# Patient Record
Sex: FEMALE | Race: WHITE | Employment: OTHER | URBAN - METROPOLITAN AREA
[De-identification: names, ages, dates, MRNs, and addresses within clinical notes are randomized per-mention and may not be internally consistent; named-entity substitution may affect disease eponyms.]

---

## 2017-01-02 ENCOUNTER — HOSPITAL ENCOUNTER (EMERGENCY)
Facility: CLINIC | Age: 67
Discharge: HOME | End: 2017-01-02
Attending: PHYSICIAN ASSISTANT | Admitting: PHYSICIAN ASSISTANT
Payer: COMMERCIAL

## 2017-01-02 ENCOUNTER — APPOINTMENT (OUTPATIENT)
Dept: GENERAL RADIOLOGY | Facility: CLINIC | Age: 67
End: 2017-01-02
Attending: EMERGENCY MEDICINE
Payer: COMMERCIAL

## 2017-01-02 VITALS
SYSTOLIC BLOOD PRESSURE: 147 MMHG | HEIGHT: 64 IN | OXYGEN SATURATION: 99 % | RESPIRATION RATE: 16 BRPM | TEMPERATURE: 98.3 F | DIASTOLIC BLOOD PRESSURE: 71 MMHG | HEART RATE: 82 BPM

## 2017-01-02 DIAGNOSIS — S80.01XA CONTUSION OF RIGHT KNEE, INITIAL ENCOUNTER: ICD-10-CM

## 2017-01-02 DIAGNOSIS — M25.522 LEFT ELBOW PAIN: ICD-10-CM

## 2017-01-02 PROCEDURE — 73090 X-RAY EXAM OF FOREARM: CPT | Mod: LT

## 2017-01-02 PROCEDURE — 99283 EMERGENCY DEPT VISIT LOW MDM: CPT

## 2017-01-02 PROCEDURE — 25000132 ZZH RX MED GY IP 250 OP 250 PS 637

## 2017-01-02 RX ORDER — IBUPROFEN 200 MG
TABLET ORAL
Status: COMPLETED
Start: 2017-01-02 | End: 2017-01-02

## 2017-01-02 RX ORDER — IBUPROFEN 600 MG/1
600 TABLET, FILM COATED ORAL ONCE
Status: COMPLETED | OUTPATIENT
Start: 2017-01-02 | End: 2017-01-02

## 2017-01-02 RX ADMIN — IBUPROFEN 600 MG: 600 TABLET, FILM COATED ORAL at 16:29

## 2017-01-02 RX ADMIN — IBUPROFEN 600 MG: 200 TABLET, FILM COATED ORAL at 16:29

## 2017-01-02 ASSESSMENT — ENCOUNTER SYMPTOMS: WOUND: 1

## 2017-01-02 NOTE — ED AVS SNAPSHOT
Emergency Department    [address] [place] [address]    Phone:  [phone]    Fax:  [phone]                                       Elise Bernard   MRN: [id number]    Department:   Emergency Department   Date of Visit:  1/2/2017           After Visit Summary Signature Page     I have received my discharge instructions, and my questions have been answered. I have discussed any challenges I see with this plan with the nurse or doctor.    ..........................................................................................................................................  Patient/Patient Representative Signature      ..........................................................................................................................................  Patient Representative Print Name and Relationship to Patient    ..................................................               ................................................  Date                                            Time    ..........................................................................................................................................  Reviewed by Signature/Title    ...................................................              ..............................................  Date                                                            Time

## 2017-01-02 NOTE — ED AVS SNAPSHOT
Emergency Department    [address] [facility] [address]    Phone:  [phone]    Fax:  [phone]                                       Elise Bernard   MRN: [id number]    Department:   Emergency Department   Date of Visit:  1/2/2017           Patient Information     Date Of Birth          1950        Your diagnoses for this visit were:     Left elbow pain     Contusion of right knee, initial encounter        You were seen by Christal Harrison PA-C.      Follow-up Information     Follow up with Myles Birmingham MD.    Specialty:  Family Practice    Contact information:    [facility]  [address] [facility] [address]  [facility] [address]  [phone]          Follow up with  Emergency Department.    Specialty:  EMERGENCY MEDICINE    Why:  If symptoms worsen    Contact information:    [address] [facility] [id number] [phone]        Discharge Instructions       Use Acetaminophen and/or Ibuprofen as needed for pain.   Wear sling for comfort. Follow-up with primary care as already scheduled for Friday.     Return to the ED with worsening pain, numbness/ tingling, weakness or if you become worse in any way.         Discharge Instructions  Extremity Injury    You were seen today for an injury to an extremity (arm, hand, leg, or foot). You may have a bruise, strain, or fracture (broken bone).    Return to the Emergency Department or see your regular doctor if your injured area is not back to normal within 5-7 days.    Return to the Emergency Department right away if:    Your pain seems to change or get worse or there is pain in a new area.    Your extremity becomes pale, cool, blue, or numb or tingling past the injury.    You have more drainage, redness or pain in the area of the cut or abrasion.    You have pain that you can t control with the medicine recommended or prescribed here, or you have pain that seems too much for your injury.    Your child will not  stop crying or is much more fussy than normal.    You have new symptoms or anything that worries you.    What to Expect:    Your swelling and pain may be worse the day after your injury, but should not be severe and should start getting better after that. You should not have new symptoms and your pain should not get worse.    You may start to get a bruise over the injured area or below the injured area.    Your movement and strength should get better with time.    Some injuries may not show up until after you have left the Emergency Department so it is important to follow-up with your regular doctor.    Your injury may prevent you from working.  Follow-up with your regular doctor to get a work release note.    Pain medications or your injury may make it unsafe to drive or operate machinery.    Home Care:    Apply ice your injured area for 15 minutes at a time, at least 3 times a day. Use a cloth between the ice bag and your skin to prevent frostbite.     Do not sleep with an ice pack or heating pad on, since this can cause burns or skin injury.    Rest your injured area for at least 1-2 days. After that you may start using your extremity again as long as there is not too much pain.     Raise the injured area above the level of your heart as much as possible in the first 1-2 days.    Use Tylenol  (acetaminophen), Motrin (ibuprofen), or Advil  (ibuprofen) for your pain unless you have an allergy or are told not to use these medications by your doctor.  Take the medications as instructed on the package. Tylenol  (acetaminophen) is in many prescription medicines and non-prescription medicines--check all of your medicines to be sure you aren t taking more than 3000 mg per day.    You may use an elastic bandage (Ace  Wrap) if it makes you more comfortable. Wrap it just tight enough to provide light compression, like a new pair of socks feels. Loosen the bandage if you have swelling past the bandage.      Please follow any  other instructions that were discussed with you by your doctor.    MORE INFORMATION:    X-rays:  X-rays done today were read by your doctor but will also be read by a radiologist.  We will contact you if the radiologist sees anything different on the x-ray.  Your regular doctor may also want to review your x-rays on follow-up.    You could have a fracture (break), even if we told you your x-rays were normal. X-rays are not always certain, and some fractures are hard to see and may not show up right away.  Also, your x-ray may look like you have a fracture, even though you do not.  It is important to follow-up with your regular doctor.     Stretching:  If your injury was to your arm or shoulder and your doctor put you in a sling or an immobilizer, it is important that you take off your immobilizer within 3 days and stretch/move your shoulder, unless your doctor specifically tells you to not move your shoulder.  This is to prevent further injury such as a  frozen shoulder .     If you were given a prescription for medicine here today, be sure to read all of the information (including the package insert) that comes with your prescription.  This will include important information about the medicine, its side effects, and any warnings that you need to know about.  The pharmacist who fills the prescription can provide more information and answer questions you may have about the medicine.  If you have questions or concerns that the pharmacist cannot address, please call or return to the Emergency Department.       Remember that you can always come back to the Emergency Department if you are not able to see your regular doctor in the amount of time listed above, if you get any new symptoms, or if there is anything that worries you.      Future Appointments        Provider Department Dept Phone Center    1/17/2017 1:30 PM Giuseppe Mullins MD [facility] [phone] [facility]      24 Hour Appointment Hotline       To make  an appointment at any [facility] clinic, call [phone] ([phone]). If you don't have a family doctor or clinic, we will help you find one. [facility] clinics are conveniently located to serve the needs of you and your family.             Review of your medicines      Our records show that you are taking the medicines listed below. If these are incorrect, please call your family doctor or clinic.        Dose / Directions Last dose taken    ASPIRIN PO   Dose:  81 mg        Take 81 mg by mouth daily   Refills:  0        donepezil 5 MG tablet   Commonly known as:  ARICEPT   Dose:  5 mg   Quantity:  30 tablet        Take 1 tablet (5 mg) by mouth At Bedtime   Refills:  3        escitalopram 10 MG tablet   Commonly known as:  LEXAPRO        Refills:  0        estradiol 0.05 MG/24HR BIW patch   Commonly known as:  VIVELLE-DOT   Dose:  1 patch        Place 1 patch onto the skin twice a week   Refills:  0        GEMFIBROZIL PO   Dose:  600 mg        Take 600 mg by mouth daily   Refills:  0        LIVALO PO   Dose:  4 mg        Take 4 mg by mouth daily   Refills:  0        METOPROLOL SUCCINATE ER PO   Dose:  50 mg        Take 50 mg by mouth daily   Refills:  0        OVER-THE-COUNTER        allertec ( antihystamine) 10 mg daily   Refills:  0        PROGESTERONE MICRONIZED PO   Dose:  200 mg        Take 200 mg by mouth 10 days a month   Refills:  0        VITAMIN D (CHOLECALCIFEROL) PO   Dose:  34849 Units        Take 50,000 Units by mouth once a week   Refills:  0                Procedures and tests performed during your visit     Radius/Ulna XR,  PA &LAT, left      Orders Needing Specimen Collection     None      Pending Results     No orders found from 1/1/2017 to 1/3/2017.       Test Results from your hospital stay           1/2/2017  3:48 PM - Interface, Radiant Ib      Narrative     XR FOREARM LT 2 VW 1/2/2017 3:42 PM    HISTORY: Fall.    COMPARISON: None.    FINDINGS: No acute fracture or malalignment. Osseous  structures are  within normal limits.        Impression     IMPRESSION: No acute osseous abnormality.    JACKLYN LOPEZ MD                Clinical Quality Measure: Blood Pressure Screening     Your blood pressure was checked while you were in the emergency department today. The last reading we obtained was  BP: 147/71 mmHg . Please read the guidelines below about what these numbers mean and what you should do about them.  If your systolic blood pressure (the top number) is less than 120 and your diastolic blood pressure (the bottom number) is less than 80, then your blood pressure is normal. There is nothing more that you need to do about it.  If your systolic blood pressure (the top number) is 120-139 or your diastolic blood pressure (the bottom number) is 80-89, your blood pressure may be higher than it should be. You should have your blood pressure rechecked within a year by a primary care provider.  If your systolic blood pressure (the top number) is 140 or greater or your diastolic blood pressure (the bottom number) is 90 or greater, you may have high blood pressure. High blood pressure is treatable, but if left untreated over time it can put you at risk for heart attack, stroke, or kidney failure. You should have your blood pressure rechecked by a primary care provider within the next 4 weeks.  If your provider in the emergency department today gave you specific instructions to follow-up with your doctor or provider even sooner than that, you should follow that instruction and not wait for up to 4 weeks for your follow-up visit.        Thank you for choosing [facility]       Thank you for choosing [facility] for your care. Our goal is always to provide you with excellent care. Hearing back from our patients is one way we can continue to improve our services. Please take a few minutes to complete the written survey that you may receive in the mail after you visit with us. Thank you!        MyChart Information      [employer] gives you secure access to your electronic health record. If you see a primary care provider, you can also send messages to your care team and make appointments. If you have questions, please call your primary care clinic.  If you do not have a primary care provider, please call [phone] and they will assist you.        After Visit Summary       This is your record. Keep this with you and show to your community pharmacist(s) and doctor(s) at your next visit.

## 2017-01-02 NOTE — ED PROVIDER NOTES
"  History     Chief Complaint:  Fall    HPI   Elise Bernard is a 66 year old female who presents with 6/10 left elbow pain and right knee pain after a mechanical fall at 1200 today. She slipped and fall, landing on her right knee and left elbow. She did not hit her head, lose consciousness, or sustain any other injuries. She currently rates her pain 6/10 at the elbow that worsens with movement. She also has bruising and a superficial abrasion to the knee. She does have mild pain to the knee, but that has been improving since the fall and she has been able to walk without difficulty. The patient denies any numbness, tingling, weakness, neck or back pain, or any other complications or concerns.      Allergies:  Vicodin     Medications:    Aricept  Lexapro  Aspirin  Vitamin D  Metoprolol  Livalo  Gemfibrozil  Estradiol patch  Progesterone  Allertec     Past Medical History:    HTN  HLD  DM  Dementia    Past Surgical History:    Adenoidectomy  Tonsillectomy  Breast reduction  Left tympanoplasty    Family History:    History reviewed.  No significant family history.      Social History:  Relationship status:   Tobacco use: Negative  Alcohol use: Positive (occasionally)  The patient presents with her .      Review of Systems   HENT:        Negative for head injury.    Musculoskeletal: Negative for back pain, gait problem and neck pain.        Positive for elbow and knee pain.    Skin: Positive for wound (abrasion and bruising).   Neurological: Negative for syncope, weakness and numbness.   All other systems reviewed and are negative.    Physical Exam   First Vitals:  BP: 147/71 mmHg  Pulse: 84  Temp: 98.3  F (36.8  C)  Resp: 16  Height: 162.6 cm (5' 4\")  SpO2: 99 %      Physical Exam  General: Resting comfortably on the gurney.    Head:  The scalp, head and face appear normal. No evidence of trauma.   Neck:  Supple, no rigidity noted. Normal ROM.     No cervical midline or paraspinal muscle tenderness. " No step-offs.   Resp:  Non-labored breathing. No tachypnea.   CV:  Radial, DP and PT pulses intact and symmetric.     Capillary refill less than 2 seconds bilateral hands and feet.     MS:  Normal muscular tone.     5/5 and symmetric strength with dorsi- and plantar-flexion at the ankle, knee flexion and extension, .     Normal and symmetric ROM with dorsi- and plantar-flexion at the ankle, knee flexion and extension, elbow flexion and extension (though with some pain on the left at extremes of ROM), and shoulder rotation.     Extensor aspect left elbow tenderness with palpation with mild swelling, no deformity or ecchymosis. Left upper extremity otherwise non-tender with palpation.     Right lower extremity:  Anterior knee there is ecchymosis and a superficial abrasion with no active bleeding. Tenderness with palpation over the ecchymosis, no other knee or lower extremity tenderness. No effusion or deformity. No laxity with varus or valgus stress, negative anterior and posterior drawer.    No thoracic or lumbar midline or paraspinal muscle tenderness with palpation. No step-offs.   Neuro: GCS 15.    Awake and alert. Obeys commands appropriately.     Sensation intact to light touch upper and lower extremities.   Skin:  As in MS, otherwise no rash, ecchymosis, abrasions or lacerations.   Psych:  Normal affect. Appropriate interactions.      Emergency Department Course   Imaging:  Radiographic findings were communicated with the patient who voiced understanding of the findings.    Left Radius/Ulna XR, PA & LAT per radiology:   No acute osseous abnormality.     Interventions:  1629: Ibuprofen, 600 mg, PO    Emergency Department Course:  Nursing notes and vitals reviewed.  I performed an exam of the patient as documented above.  The above workup was undertaken.  1654: I discussed XR results.  The patient was placed in a sling for comfort.   Findings and plan explained to the Patient and spouse. Patient discharged  home with instructions regarding supportive care, medications, and reasons to return. The importance of close follow-up was reviewed.    Impression & Plan    Medical Decision Making:  Elise Bernard is a 66 year old female presents for evaluation after a mechanical fall.  Signs and symptoms are consistent with contusions of the knee and more mild contusion of the elbow.  A broad differential was considered including sprain, strain, fracture, tendon rupture, nerve impingement/compromise, referred pain. X-ray of the elbow was negative for fracture or dislocation. The patient has been able to walk on the knee and has no difficulty with full active range of motion, therefore knee x-ray was not indicated. Supportive outpatient management is indicated.  Rest, ice, and elevation treatment was discussed with the patient. No other areas of pain or injury to require further emergent evaluation. Close follow-up with patient's primary care physician per discharge precautions. I discussed the results, plan and any additional questions with the patient and her . They verbalized understanding and agreement with the plan.       Diagnosis:    ICD-10-CM   1. Left elbow pain M25.522   2. Contusion of right knee, initial encounter S80.01XA     Disposition:  Discharge to home with primary care follow up.       ISkylar, am serving as a scribe on 1/2/2017 at 4:54 PM to personally document services performed by Christal Harrison PA-C, based on my observations and the provider's statements to me.     EMERGENCY DEPARTMENT    Christal Harrison PA-C  01/04/17 2004

## 2017-01-02 NOTE — DISCHARGE INSTRUCTIONS
Use Acetaminophen and/or Ibuprofen as needed for pain.   Wear sling for comfort. Follow-up with primary care as already scheduled for Friday.     Return to the ED with worsening pain, numbness/ tingling, weakness or if you become worse in any way.         Discharge Instructions  Extremity Injury    You were seen today for an injury to an extremity (arm, hand, leg, or foot). You may have a bruise, strain, or fracture (broken bone).    Return to the Emergency Department or see your regular doctor if your injured area is not back to normal within 5-7 days.    Return to the Emergency Department right away if:    Your pain seems to change or get worse or there is pain in a new area.    Your extremity becomes pale, cool, blue, or numb or tingling past the injury.    You have more drainage, redness or pain in the area of the cut or abrasion.    You have pain that you can t control with the medicine recommended or prescribed here, or you have pain that seems too much for your injury.    Your child will not stop crying or is much more fussy than normal.    You have new symptoms or anything that worries you.    What to Expect:    Your swelling and pain may be worse the day after your injury, but should not be severe and should start getting better after that. You should not have new symptoms and your pain should not get worse.    You may start to get a bruise over the injured area or below the injured area.    Your movement and strength should get better with time.    Some injuries may not show up until after you have left the Emergency Department so it is important to follow-up with your regular doctor.    Your injury may prevent you from working.  Follow-up with your regular doctor to get a work release note.    Pain medications or your injury may make it unsafe to drive or operate machinery.    Home Care:    Apply ice your injured area for 15 minutes at a time, at least 3 times a day. Use a cloth between the ice bag and  your skin to prevent frostbite.     Do not sleep with an ice pack or heating pad on, since this can cause burns or skin injury.    Rest your injured area for at least 1-2 days. After that you may start using your extremity again as long as there is not too much pain.     Raise the injured area above the level of your heart as much as possible in the first 1-2 days.    Use Tylenol  (acetaminophen), Motrin (ibuprofen), or Advil  (ibuprofen) for your pain unless you have an allergy or are told not to use these medications by your doctor.  Take the medications as instructed on the package. Tylenol  (acetaminophen) is in many prescription medicines and non-prescription medicines--check all of your medicines to be sure you aren t taking more than 3000 mg per day.    You may use an elastic bandage (Ace  Wrap) if it makes you more comfortable. Wrap it just tight enough to provide light compression, like a new pair of socks feels. Loosen the bandage if you have swelling past the bandage.      Please follow any other instructions that were discussed with you by your doctor.    MORE INFORMATION:    X-rays:  X-rays done today were read by your doctor but will also be read by a radiologist.  We will contact you if the radiologist sees anything different on the x-ray.  Your regular doctor may also want to review your x-rays on follow-up.    You could have a fracture (break), even if we told you your x-rays were normal. X-rays are not always certain, and some fractures are hard to see and may not show up right away.  Also, your x-ray may look like you have a fracture, even though you do not.  It is important to follow-up with your regular doctor.     Stretching:  If your injury was to your arm or shoulder and your doctor put you in a sling or an immobilizer, it is important that you take off your immobilizer within 3 days and stretch/move your shoulder, unless your doctor specifically tells you to not move your shoulder.  This is  to prevent further injury such as a  frozen shoulder .     If you were given a prescription for medicine here today, be sure to read all of the information (including the package insert) that comes with your prescription.  This will include important information about the medicine, its side effects, and any warnings that you need to know about.  The pharmacist who fills the prescription can provide more information and answer questions you may have about the medicine.  If you have questions or concerns that the pharmacist cannot address, please call or return to the Emergency Department.       Remember that you can always come back to the Emergency Department if you are not able to see your regular doctor in the amount of time listed above, if you get any new symptoms, or if there is anything that worries you.

## 2017-01-04 ENCOUNTER — CARE COORDINATION (OUTPATIENT)
Dept: CASE MANAGEMENT | Facility: CLINIC | Age: 67
End: 2017-01-04

## 2017-01-04 ASSESSMENT — ENCOUNTER SYMPTOMS
NUMBNESS: 0
BACK PAIN: 0
WEAKNESS: 0
NECK PAIN: 0

## 2017-01-09 NOTE — PROGRESS NOTES
Clinic Care Coordination Contact  OUTREACH    Referral Information:  Referral Source: ED Follow-Up  Reason for Contact: Fall with injury  Care Conference: No     Universal Utilization:   ED Visits in last year: 1  Hospital visits in last year: 0      Clinical Concerns:  Current Medical Concerns:  would not discuss    Current Behavioral Concerns: Dementia. Goes to Adult        Medication Management:  Current Outpatient Prescriptions   Medication     donepezil (ARICEPT) 5 MG tablet     escitalopram (LEXAPRO) 10 MG tablet     ASPIRIN PO     VITAMIN D, CHOLECALCIFEROL, PO     METOPROLOL SUCCINATE ER PO     Pitavastatin Calcium (LIVALO PO)     GEMFIBROZIL PO     OVER-THE-COUNTER     estradiol (VIVELLE-DOT) 0.05 MG/24HR patch     PROGESTERONE MICRONIZED PO     No current facility-administered medications for this visit.     Psychosocial:  Current living arrangement:: I live in a private home with spouse  Financial/Insurance: [facility]     Resources and Interventions:  Current Resources:Refused to discuss the need for any at this time  Advanced Care Plans/Directives on file:: Yes    Patient/Caregiver understanding:Call placed to the home number given, as a follow up from an ED visit on 1/2/2017, injuring an elbow and knee.  answered the phone and was not pleased with receiving the call from this writer. Stated he was the POA and could answer questions for his wife. Explained who I was and why I was calling. He questioned why I hadn't read her chart and knew that she would be coming in to see Dr Birmingham tomorrow, 1/10/2017. Stated there is nothing wrong and he sis not appreciate cold calls. Explained to him where I gather my information and from what report. It did not seem to matter at this point. He did state that his wife is ok and that she is at Adult .     Upcoming appointment: 01/10/17     Plan: Will notify Dr Birmingham of call placed, prior to appt with member and her  on  1/10/2017.    Rashida Carmichael RN  Care Coordinator/  Phone: [phone]  Email: [email or web address]

## 2017-01-17 ENCOUNTER — OFFICE VISIT (OUTPATIENT)
Dept: NEUROLOGY | Facility: CLINIC | Age: 67
End: 2017-01-17

## 2017-01-17 VITALS
WEIGHT: 134.6 LBS | BODY MASS INDEX: 22.98 KG/M2 | DIASTOLIC BLOOD PRESSURE: 64 MMHG | HEIGHT: 64 IN | HEART RATE: 87 BPM | SYSTOLIC BLOOD PRESSURE: 151 MMHG

## 2017-01-17 DIAGNOSIS — G31.09 FRONTOTEMPORAL DEMENTIA (H): Primary | ICD-10-CM

## 2017-01-17 DIAGNOSIS — F02.80 FRONTOTEMPORAL DEMENTIA (H): Primary | ICD-10-CM

## 2017-01-17 NOTE — LETTER
1/17/2017      RE: Elise Bernard  [address] [place] [address]       No notes on file    Giuseppe Mullins MD

## 2017-01-17 NOTE — LETTER
1/17/2017     RE: Elise Bernard  [address] [place] [address]     Dear Colleague,    Thank you for referring your patient, Elise Bernard, to the [facility] at [facility]. Please see a copy of my visit note below.    Mrs. Bernard is a 66 year old woman who was referred by Dr Patel, thank you for allowing me to participate in the care of your patient.      Sincerely,    Giuseppe Mullins MD

## 2017-01-17 NOTE — Clinical Note
1/17/2017       RE: Elise Bernard  [address] [place] [address]     Dear Colleague,    Thank you for referring your patient, Elise Bernard, to the [facility] at [facility]. Please see a copy of my visit note below.    No notes on file    Again, thank you for allowing me to participate in the care of your patient.      Sincerely,    Giuseppe Mullins MD

## 2017-01-24 ENCOUNTER — TELEPHONE (OUTPATIENT)
Dept: NEUROLOGY | Facility: CLINIC | Age: 67
End: 2017-01-24

## 2017-01-24 DIAGNOSIS — R41.3 MEMORY LOSS: Primary | ICD-10-CM

## 2017-01-26 ENCOUNTER — TELEPHONE (OUTPATIENT)
Dept: NEUROLOGY | Facility: CLINIC | Age: 67
End: 2017-01-26

## 2017-01-26 DIAGNOSIS — R41.3 MEMORY LOSS: Primary | ICD-10-CM

## 2017-01-27 ENCOUNTER — OFFICE VISIT (OUTPATIENT)
Dept: NEUROLOGY | Facility: CLINIC | Age: 67
End: 2017-01-27

## 2017-01-27 VITALS
DIASTOLIC BLOOD PRESSURE: 83 MMHG | HEIGHT: 64 IN | SYSTOLIC BLOOD PRESSURE: 144 MMHG | HEART RATE: 89 BPM | WEIGHT: 134 LBS | BODY MASS INDEX: 22.88 KG/M2

## 2017-01-27 DIAGNOSIS — R41.3 MEMORY LOSS: ICD-10-CM

## 2017-01-27 LAB
APPEARANCE CSF: CLEAR
COLOR CSF: COLORLESS
GLUCOSE CSF-MCNC: 67 MG/DL (ref 40–70)
MISCELLANEOUS TEST: NORMAL
PROT CSF-MCNC: 53 MG/DL (ref 15–60)
RBC # CSF MANUAL: 0 /UL (ref 0–2)
TUBE # CSF: 3 #
WBC # CSF MANUAL: 0 /UL (ref 0–5)

## 2017-01-27 RX ORDER — BLOOD SUGAR DIAGNOSTIC
STRIP MISCELLANEOUS
COMMUNITY
Start: 2017-01-23

## 2017-01-27 RX ORDER — ESTRADIOL 0.04 MG/D
1 PATCH, EXTENDED RELEASE TRANSDERMAL
COMMUNITY
Start: 2017-01-20

## 2017-01-27 NOTE — PROGRESS NOTES
PURPOSE OF VISIT: Diagnostic lumbar puncture.   REQUESTING PHYSICIAN: Dr Mullins  INDICATION: Clinical evaluation of dementia and behavior changes.   CONTRAINDICATIONS: This individual has no sensitivity to topical iodine or allergy to local anesthetic. She is not on any anticoagulation medications. She has no history of bleeding diathesis or coagulopathy.   CONSENT: This was obtained directly from the patient and  and signed by patient's  who is patient's POA and  as documented on the [facility] Affirmation of Consent Surgery or Invasive Procedure.   PATIENT SAFETY: Invasive procedure safety check was successfully completed.   : Justine ELLIS CNP  ASSISTANT: Jeronimo De Leon MD  DESCRIPTION: Elise Bernard was positioned in the left lateral decubitus. The lumbar skin was prepared with an iodine-containing solution at the mid lumbar level. Five  cubic centimeters of 1%  lidocaine  was infiltrated locally at the L4-L5 interspace level initially and repeated five cubic centimeters of 1% lidocaine once due to patient's discomfort . The lumbar subarachnoid space was not entered on the third pass by utilizing a 3-1/2 inch, 22 gauge, pencil-point Jamaal needle. The above mentioned space was entered on the second pass with 3-1/2 inch, 20 gauge Quincke needle. There was an initial show of pink tinge, which cleared rapidly. The opening pressure was eleven cm CSF. The CSF meniscus exhibited good respiratory and cardiac dynamics. Ten milliliters of spinal fluid was removed and sent to the lab for tests per Dr. Mullins request, see Epic for orders.  COMPLICATIONS: None were identified.   HEALTH LITERACY: The concept and management of post lumbar puncture headache were discussed with the patient and her  prior to the start of the lumbar puncture.   DISPOSITION: Patient left the Clinic in no acute distress. Patient was accompanied by her home nurse/assitent Zhen and clinic RN to the  laboratory. Study results will be conveyed to the patient by Dr. Mullins    This procedure was performed under a hospital privileging agreement with Dr. Decker, Neurologist     Justine ELLIS, [facility]  Neurology Clinic

## 2017-01-27 NOTE — PROGRESS NOTES
[facility] NEUROLOGY  [address] [place] [address]  Dept: [phone]  ______________________________________________________________________________    Patient: Elise Bernard   : 1950   MRN: [id number]   2017    INVASIVE PROCEDURE SAFETY CHECKLIST    Date: 2017   Procedure:Diagnostic Lumbar Puncture  Patient Name: Elise Bernard  MRN: [id number]  YOB: 1950    Action: Complete sections as appropriate. Any discrepancy results in a HARD COPY until resolved.     PRE PROCEDURE:  Patient ID verified with 2 identifiers (name and  or MRN): Yes  Procedure and site verified with patient/designee (when able): Yes  Accurate consent documentation in medical record: Yes  H&P (or appropriate assessment) documented in medical record: Yes  H&P must be up to 20 days prior to procedure and updates within 24 hours of procedure as applicable: Yes  Relevant diagnostic and radiology test results appropriately labeled and displayed as applicable: Yes  Procedure site(s) marked with provider initials: NA    TIMEOUT:  Time-Out performed immediately prior to starting procedure, including verbal and active participation of all team members addressing the following:Yes  * Correct patient identify  * Confirmed that the correct side and site are marked  * An accurate procedure consent form  * Agreement on the procedure to be done  * Correct patient position  * Relevant images and results are properly labeled and appropriately displayed  * The need to administer antibiotics or fluids for irrigation purposes during the procedure as applicable   * Safety precautions based on patient history or medication use    DURING PROCEDURE: Verification of correct person, site, and procedures any time the responsibility for care of the patient is transferred to another member of the care team.

## 2017-01-27 NOTE — NURSING NOTE
Medical     Are you taking Coumadin (Warfarin)?No   Are you currently receiving Heparin? No  Do you take aspirin daily?            No    Infectious Disease   Have you been exposed to AIDS or HIV? No  Are you HIV positive?        No  Do you have chronic hepatitis?               No  Is your immune system otherwise compromised? No    Allergies   Do you have a sensitivity to latex? No  If yes, please explain:    Are you allergic to iodine?         No  Are you allergic to local anesthetic? No

## 2017-01-27 NOTE — PATIENT INSTRUCTIONS
Having a Lumbar Puncture  A lumbar puncture (spinal tap) may be used to help diagnose certain problems in your brain or spinal cord. Prepare for your test as instructed. From start to finish, your procedure will take about 30-60 minutes. The test may be done in a medical office.This test may be done in an acute care setting like the emergency room or the hospital. Occasionally, the procedure is done in a radiology suite with X-ray guidance. Your doctor may screen you for bleeding disorders and may order a head CT or MRI before the procedure to ensure there is no increased risk of performing the procedure.    During the test  You will lie on your side with your knees drawn into your chest. (This is called the fetal position.) Or, you may be asked to sit bent forward, with your chin down. First, your low back will be wiped with a special cleanser. It will then be injected with a numbing medication. Then, the doctor will insert a sterile needle into the sac that contains the spinal fluid. Despite the use of the local anesthetic, you may feel some pain or pressure when this happens. Try to remain still. And be sure to do as you re asked. Some spinal fluid will be withdrawn though the needle. The needle is then removed. Finally, a small bandage is placed over the puncture site. You may be asked to lie still for a short time before you leave.  After the test    When you re able to leave, have an adult family member or friend drive you home.     When you get home, rest as directed by your healthcare provider.     If you get a headache, lying flat and drinking plenty of fluids may help relieve it. The headache is typically positional and gets better over time. You may also want to take an over-the-counter pain reliever, but avoid aspirin. Drinking caffeinated beverages, such as soda, coffee, or tea, can help relieve a headache after a lumbar puncture.    The day after your lumbar puncture, you can remove your  bandage.     Your provider will tell you when the results of your lumbar puncture are ready.  Call your doctor if you have:    A severe headache or a headache that lasts 2 or more days    Pain in your back that persists    Tingling in your groin or legs    Fever    Change in bowel or urination functions     4918-1422 [employer]. [address] [place] [address], [place] [address]. All rights reserved. This information is not intended as a substitute for professional medical care. Always follow your healthcare professional's instructions.

## 2017-01-29 LAB
ALB CSF/SERPL: 8.7 {RATIO}
ALBUMIN CSF-MCNC: 37 MG/DL
ALBUMIN SERPL-MCNC: 4240 G/DL
B BURGDOR IGG CSF QL IB: NORMAL
B BURGDOR IGM CSF QL IB: NORMAL
IGG CSF-MCNC: 2.7 MG/DL
IGG SERPL-MCNC: 794 MG/DL
IGG SYNTH RATE SER+CSF CALC-MRATE: ABNORMAL MG/DL
IGG/ALB CLEAR SER+CSF-RTO: 0.39
IGG/ALB CSF: 0.07 {RATIO}
MISCELLANEOUS TEST: NORMAL
OLIGOCLONAL BANDS CSF ELPH-IMP: ABNORMAL
OLIGOCLONAL BANDS CSF ELPH-IMP: ABNORMAL
OLIGOCLONAL BANDS CSF IEF: 0

## 2017-01-31 LAB — VDRL CSF QL: NORMAL

## 2017-02-01 LAB
RESULT: NORMAL
SEND OUTS MISC TEST CODE: NORMAL
SEND OUTS MISC TEST SPECIMEN: NORMAL
TEST NAME: NORMAL

## 2017-02-03 LAB — 1433 PROTEIN CSF: 2.3

## 2017-02-12 LAB — LAB SCANNED RESULT: NORMAL

## 2017-02-20 ENCOUNTER — HOSPITAL ENCOUNTER (EMERGENCY)
Facility: CLINIC | Age: 67
Discharge: HOME | End: 2017-02-20
Attending: PHYSICIAN ASSISTANT | Admitting: PHYSICIAN ASSISTANT
Payer: COMMERCIAL

## 2017-02-20 VITALS
BODY MASS INDEX: 20.66 KG/M2 | SYSTOLIC BLOOD PRESSURE: 166 MMHG | RESPIRATION RATE: 16 BRPM | TEMPERATURE: 98.4 F | HEIGHT: 64 IN | DIASTOLIC BLOOD PRESSURE: 74 MMHG | OXYGEN SATURATION: 99 % | HEART RATE: 104 BPM | WEIGHT: 121 LBS

## 2017-02-20 DIAGNOSIS — Z77.098 CHEMICAL EXPOSURE OF EYE: ICD-10-CM

## 2017-02-20 PROCEDURE — 25000132 ZZH RX MED GY IP 250 OP 250 PS 637

## 2017-02-20 PROCEDURE — 99282 EMERGENCY DEPT VISIT SF MDM: CPT

## 2017-02-20 RX ORDER — PROPARACAINE HYDROCHLORIDE 5 MG/ML
SOLUTION/ DROPS OPHTHALMIC
Status: COMPLETED
Start: 2017-02-20 | End: 2017-02-20

## 2017-02-20 RX ADMIN — PROPARACAINE HYDROCHLORIDE 1 DROP: 5 SOLUTION/ DROPS OPHTHALMIC at 17:23

## 2017-02-20 ASSESSMENT — ENCOUNTER SYMPTOMS
EYE PAIN: 1
EYE REDNESS: 1

## 2017-02-20 ASSESSMENT — VISUAL ACUITY
OD: 20/30
OS: 20/40

## 2017-02-20 NOTE — ED AVS SNAPSHOT
Emergency Department    [address] [place] [address]    Phone:  [phone]    Fax:  [phone]                                       Elise Bernard   MRN: [id number]    Department:   Emergency Department   Date of Visit:  2/20/2017           After Visit Summary Signature Page     I have received my discharge instructions, and my questions have been answered. I have discussed any challenges I see with this plan with the nurse or doctor.    ..........................................................................................................................................  Patient/Patient Representative Signature      ..........................................................................................................................................  Patient Representative Print Name and Relationship to Patient    ..................................................               ................................................  Date                                            Time    ..........................................................................................................................................  Reviewed by Signature/Title    ...................................................              ..............................................  Date                                                            Time

## 2017-02-20 NOTE — ED PROVIDER NOTES
"  History     Chief Complaint:  Eye Pain    HPI   Elise Bernard is a 66 year old female with a history of dementia who presents with her  to the emergency department today for evaluation of eye pain. The patient's  states that earlier today he witnessed the patient putting Clear Care contact solution into her eyes bilaterally.The patient states that she put 1-2 drops of Clear Care in her eyes about 2 hours ago. She indicates that she is experiencing eye pain and redness. She denies any vision changes, eye itching or drainage, headache or fever, or any other complications or concerns.     Allergies:  Vicodin, itching     Medications:    Estradiol  Accu-Chek Sunni Plus  Aricept  Lexapro  Metoprolol Succinate   Livalo  Gemfibrozil  Progesterone Micronized    Past Medical History:    Dementia  Diabetes  Hyperlipidemia  Hypertension    Past Surgical History:    Adenoidectomy  ENT Surgery  Breast Reduction  Colonoscopy  Tympanoplasty    Family History:    Father positive for Heart Disease    Social History:  The patient was accompanied to the ED by her .  Smoking Status: negative  Alcohol Use: positive    Marital Status:   [2]     Review of Systems   Constitutional: Negative for fever.   Eyes: Positive for pain and redness. Negative for discharge, itching and visual disturbance.   Neurological: Negative for headaches.   All other systems reviewed and are negative.      Physical Exam   First Vitals:   BP: 166/74  Temp: 98.4  F (36.9  C)  Temp src: Oral  Pulse: 104  Resp: 18  SpO2: 99 %  Height: 162.6 cm (5' 4\")  Weight: 54.9 kg (121 lb) (02/20 1710)     Physical Exam  General: Resting comfortably on the gurney.    Head:  The scalp, head and face appear normal. No evidence of trauma.   ENT:  Pupils are equal, round and reactive to light. EOM intact.     Bilateral conjunctival injection with tearing. No cloudiness to the cornea.      Initial pH: 7, post irrigation pH: 7 bilaterally "   Neck:  Supple, no rigidity noted. Normal ROM.     Trachea midline. No mass detected.    Lymph: No anterior or posterior cervical lymphadenopathy noted.   Resp:  Non-labored breathing. No tachypnea.     Lung fields clear to auscultation without wheezes or rales.   CV:  Regular rate and rhythm. Normal S1 and S2, no S3 or S4.     No pathological murmur detected.   MS:  Normal muscular tone.   Neuro: Awake and alert. Speech is clear.   Skin:  No rash or pallor.  Psych:  Normal affect. Appropriate interactions.     VISION:  Right eye: 20/30  Left eye: 20/40 -1  With glasses    Emergency Department Course   Interventions:  1723 Alcaine 2 drops both eyes    Emergency Department Course:  Nursing notes and vitals reviewed. I performed an exam of the patient as documented above.     Copious irrigation with 750 mL of Normal Saline via the tracy lens was performed bilaterally. The patient's symptoms were improved.    1820 I reevaluated the patient and provided an update in regards to her ED course.      Findings and plan explained to the Patient and her . Patient discharged home with instructions regarding supportive care, medications, and reasons to return. The importance of close follow-up was reviewed.     Impression & Plan    Medical Decision Making:  Elise Bernard is a 66 year old female with a history of dementia who presents to the emergency department with bilateral eye pain. She accidentally put Clear Care, her contact  drops, into her eyes bilaterally and has since developed irration to both eyes. This is a weakly acidic solution with the hydrogen peroxide. No vision changes and visual acuity here is largely normal, somewhat difficult exam due to her dementia. pH is normal before and after copious irrigation with 750 mL of Normal Saline via the tracy lens. No evidence of more significant injury such as surface burn, deterioration of the cornea, or any other acute abnormality. I feel she is  safe for outpatient management. They will follow up with her ophthalmologist in 1-2 days for recheck and will return to the emergency department with vision changes, worsening pain, or if she becomes worse in any way. I discussed the results, plan and any additional questions with the patient and her . They verbalized understanding and agreement with the plan.       Diagnosis:    ICD-10-CM    1. Chemical exposure of eye Z77.098     bilateral       Disposition:  Discharged to home.    I, Mariangel Manzo, am serving as a scribe on 2/20/2017 at 5:30 PM to personally document services performed by Christal Harrison PA-C based on my observations and the provider's statements to me.     Mariangel Manzo  2/20/2017    EMERGENCY DEPARTMENT       Christal Harrison PA-C  02/21/17 [id number]

## 2017-02-20 NOTE — ED AVS SNAPSHOT
Emergency Department    [address] [facility] [address]    Phone:  [phone]    Fax:  [phone]                                       Elise Bernard   MRN: [id number]    Department:   Emergency Department   Date of Visit:  2/20/2017           Patient Information     Date Of Birth          1950        Your diagnoses for this visit were:     Chemical exposure of eye bilateral       You were seen by Christal Harrison PA-C.      Follow-up Information     Follow up with  Emergency Department.    Specialty:  EMERGENCY MEDICINE    Why:  If symptoms worsen    Contact information:    [address] [facility] [id number] [phone]        Discharge Instructions         Chemical Exposure: Eye    A chemical exposure, or injury, to the eye can occur when an acidic or basic solution comes in contact with the eye. As soon as the chemical exposure occurs, it is very important to immediately irrigate and flush your eye with sterile saline solution. If sterile saline is unavailable to you immediately, then flushing with tap water is an acceptable alternative. The effects of a chemical exposure can range from mild irritation to permanent scarring and vision loss. The amount of injury to your eye depends on what type of chemical it was, how concentrated it was, whether it was an acidic or basic substance, and how long it was in your eye. After flushing the eye, it is important to follow up with a doctor if you experience any vision blurriness or pain.  It is common to have some irritation for the next 24 hours, even in mild cases. If the exposure was more serious, be sure to follow up as directed.  The pressure inside of the eye can increase hours to days after a chemical eye injury (glaucoma). This requires prompt treatment. Watch for the symptoms described below.  Home care    A cold pack (ice in a plastic bag, wrapped in a towel) may be applied over the eye for 20 minutes at  a time. This will reduce pain.    Eye drops may be prescribed to reduce irritation, inflammation, and risk of infection. If no drops were prescribed, you may use over-the-counter decongestant eye drops for irritation or redness.    You may use acetaminophen or ibuprofen to control pain, unless another medicine was prescribed. (Note: If you have chronic liver or kidney disease or have ever had a stomach ulcer or gastrointestinal bleeding, talk with your doctor before using these medicines.)    If an eye patch was applied:    You may place the cold pack over the eye patch.    If you were given a follow-up appointment for patch removal and re-exam, do not miss it. An eye patch should not be left in place for more than 48 hours, unless you are advised to do so by your healthcare provider.    Do not drive a motor vehicle or use machinery with the eye patch in place. It is difficult to  distance with only one eye.  Follow-up care  Follow up with your healthcare provider, or as directed.  When to seek medical advice  Call your health care provider right away if any of these occur.    Increased eyelid swelling    Increasing pain in the eye    Increasing redness or drainage from the eye    Failure of normal vision to return within 24 to 48 hours    Continued feeling that something is in your eye, lasting more than 48 hours    2149-7794 [employer]. [address] [place] [address], [place] [address]. All rights reserved. This information is not intended as a substitute for professional medical care. Always follow your healthcare professional's instructions.          Future Appointments        Provider Department Dept Phone Center    2/28/2017 10:30 AM Cyn Johnson OT [facility] [phone] [facility]    2/28/2017 1:30 PM Giuseppe Mullins MD [facility] [phone] [facility]      24 Hour Appointment Hotline       To make an appointment at any [facility], call [phone] ([phone]). If you  don't have a family doctor or clinic, we will help you find one. [place] clinics are conveniently located to serve the needs of you and your family.             Review of your medicines      Our records show that you are taking the medicines listed below. If these are incorrect, please call your family doctor or clinic.        Dose / Directions Last dose taken    ACCU-CHEK JUANY PLUS test strip   Generic drug:  blood glucose monitoring        Refills:  0        ASPIRIN PO   Dose:  81 mg        Take 81 mg by mouth daily   Refills:  0        donepezil 5 MG tablet   Commonly known as:  ARICEPT   Dose:  5 mg   Quantity:  30 tablet        Take 1 tablet (5 mg) by mouth At Bedtime   Refills:  3        escitalopram 10 MG tablet   Commonly known as:  LEXAPRO        Refills:  0        * estradiol 0.05 MG/24HR BIW patch   Commonly known as:  VIVELLE-DOT   Dose:  1 patch        Place 1 patch onto the skin twice a week   Refills:  0        * estradiol 0.0375 MG/24HR BIW patch   Commonly known as:  VIVELLE-DOT        Refills:  0        GEMFIBROZIL PO   Dose:  600 mg        Take 600 mg by mouth daily   Refills:  0        LIVALO PO   Dose:  4 mg        Take 4 mg by mouth daily   Refills:  0        METOPROLOL SUCCINATE ER PO   Dose:  50 mg        Take 50 mg by mouth daily   Refills:  0        OVER-THE-COUNTER        allertec ( antihystamine) 10 mg daily   Refills:  0        PROGESTERONE MICRONIZED PO   Dose:  200 mg        Take 200 mg by mouth 10 days a month   Refills:  0        VITAMIN D (CHOLECALCIFEROL) PO   Dose:  87825 Units        Take 50,000 Units by mouth once a week   Refills:  0        * Notice:  This list has 2 medication(s) that are the same as other medications prescribed for you. Read the directions carefully, and ask your doctor or other care provider to review them with you.            Orders Needing Specimen Collection     None      Pending Results     No orders found from 2/18/2017 to 2/21/2017.             Pending Culture Results     No orders found from 2/18/2017 to 2/21/2017.             Test Results from your hospital stay            Clinical Quality Measure: Blood Pressure Screening     Your blood pressure was checked while you were in the emergency department today. The last reading we obtained was  BP: 166/74 . Please read the guidelines below about what these numbers mean and what you should do about them.  If your systolic blood pressure (the top number) is less than 120 and your diastolic blood pressure (the bottom number) is less than 80, then your blood pressure is normal. There is nothing more that you need to do about it.  If your systolic blood pressure (the top number) is 120-139 or your diastolic blood pressure (the bottom number) is 80-89, your blood pressure may be higher than it should be. You should have your blood pressure rechecked within a year by a primary care provider.  If your systolic blood pressure (the top number) is 140 or greater or your diastolic blood pressure (the bottom number) is 90 or greater, you may have high blood pressure. High blood pressure is treatable, but if left untreated over time it can put you at risk for heart attack, stroke, or kidney failure. You should have your blood pressure rechecked by a primary care provider within the next 4 weeks.  If your provider in the emergency department today gave you specific instructions to follow-up with your doctor or provider even sooner than that, you should follow that instruction and not wait for up to 4 weeks for your follow-up visit.        Thank you for choosing [facility]       Thank you for choosing [facility] for your care. Our goal is always to provide you with excellent care. Hearing back from our patients is one way we can continue to improve our services. Please take a few minutes to complete the written survey that you may receive in the mail after you visit with us. Thank you!        MyChart Information     [employer]  gives you secure access to your electronic health record. If you see a primary care provider, you can also send messages to your care team and make appointments. If you have questions, please call your primary care clinic.  If you do not have a primary care provider, please call [phone] and they will assist you.        Care EveryWhere ID     This is your Care EveryWhere ID. This could be used by other organizations to access your [facility] medical records  [id number]        After Visit Summary       This is your record. Keep this with you and show to your community pharmacist(s) and doctor(s) at your next visit.

## 2017-02-21 ENCOUNTER — HOSPITAL ENCOUNTER (EMERGENCY)
Facility: CLINIC | Age: 67
Discharge: HOME | End: 2017-02-21
Attending: PHYSICIAN ASSISTANT | Admitting: PHYSICIAN ASSISTANT
Payer: COMMERCIAL

## 2017-02-21 VITALS
WEIGHT: 134 LBS | SYSTOLIC BLOOD PRESSURE: 175 MMHG | TEMPERATURE: 97.5 F | DIASTOLIC BLOOD PRESSURE: 89 MMHG | OXYGEN SATURATION: 98 % | BODY MASS INDEX: 23 KG/M2 | RESPIRATION RATE: 16 BRPM

## 2017-02-21 DIAGNOSIS — H10.31 ACUTE CONJUNCTIVITIS OF RIGHT EYE, UNSPECIFIED ACUTE CONJUNCTIVITIS TYPE: ICD-10-CM

## 2017-02-21 PROCEDURE — 99283 EMERGENCY DEPT VISIT LOW MDM: CPT

## 2017-02-21 RX ORDER — PROPARACAINE HYDROCHLORIDE 5 MG/ML
SOLUTION/ DROPS OPHTHALMIC
Status: DISCONTINUED
Start: 2017-02-21 | End: 2017-02-21 | Stop reason: HOSPADM

## 2017-02-21 RX ORDER — POLYMYXIN B SULFATE AND TRIMETHOPRIM 1; 10000 MG/ML; [USP'U]/ML
1 SOLUTION OPHTHALMIC EVERY 4 HOURS
Qty: 1 BOTTLE | Refills: 0 | Status: SHIPPED | OUTPATIENT
Start: 2017-02-21 | End: 2017-02-28

## 2017-02-21 ASSESSMENT — ENCOUNTER SYMPTOMS
HEADACHES: 0
FEVER: 0
EYE ITCHING: 0
EYE DISCHARGE: 0

## 2017-02-21 ASSESSMENT — VISUAL ACUITY
OD: 20/60
OS: 20/40

## 2017-02-21 NOTE — ED AVS SNAPSHOT
"  Emergency Department    [address] [facility] [address]    Phone:  [phone]    Fax:  [phone]                                       Elise Bernard   MRN: [id number]    Department:   Emergency Department   Date of Visit:  2/21/2017           Patient Information     Date Of Birth          1950        Your diagnoses for this visit were:     Acute conjunctivitis of right eye, unspecified acute conjunctivitis type suspect associated corneal abrasion       You were seen by Christal Harrison PA-C.      Follow-up Information     Follow up with  Emergency Department.    Specialty:  EMERGENCY MEDICINE    Why:  If symptoms worsen    Contact information:    [address] [facility] [address] [phone]        Discharge Instructions       Discharge Instructions  Conjunctivitis  Conjunctivitis, or \"pinkeye\", is inflammation of the conjunctiva which is the thin membrane that lines the inner surface of the eyelids and the whites of the eyes.   There are four main types of conjunctivitis: viral, bacterial, allergic, and non-specific. Both bacterial and viral conjunctivitis spread easily from one person to another by contact with the eye or another person s hands, by an object the infected person has touched, such as a door handle, or by sharing an object that has touched their eye such as a towel or pillow case. Because of this, children with bacterial conjunctivitis can t go back to school or  until they have been on antibiotic drops for 24 hours.  VIRAL CONJUNCTIVITIS:  This is typically caused by the virus that also causes the common cold and is often seen as part of a general cold.  This type of conjunctivitis is not treated with antibiotics, and usually lasts 3 - 5 days.  An over the counter antihistamine/decongestant eye drop may help to relieve the itching and irritation of viral conjunctivitis.  BACTERIAL CONJUNCTIVITIS:  This is treated with an antibiotic " ointment or eye drop.  In both bacterial and viral conjunctivitis, do not wear contact lenses until your eye is no longer red.   Your contact case should be thrown away and the contacts disinfected overnight, or replaced if disposable.  NON SPECIFIC CONJUNCTIVITIS: Sometimes a red eye is caused by other things such as dry eye, chemical exposure, or foreign body in the eye such as dust or eyelash.   All of these problems generally improve on their own within 24 hours.  ALLERGIC CONJUNCTIVITIS: These are eye symptoms caused by allergies. This type of conjunctivitis will be treated with allergy medications.    Return to the Emergency Department if:    If you have blurry vision.    If you have increasing eye pain or drainage.    If you have redness or swelling in the skin around the eye.    If you have a fever.    Follow-up with your doctor:      Your eye should improve within 2 days, if it does not, return to the Emergency Department or see your regular doctor for a second eye exam.  If you were given a prescription for medicine here today, be sure to read all of the information (including the package insert) that comes with your prescription.  This will include important information about the medicine, its side effects, and any warnings that you need to know about.  The pharmacist who fills the prescription can provide more information and answer questions you may have about the medicine.  If you have questions or concerns that the pharmacist cannot address, please call or return to the Emergency Department.           Future Appointments        Provider Department Dept Phone Center    2/28/2017 10:30 AM Cyn Johnson OT [facility] [phone] [facility]    2/28/2017 1:30 PM Giuseppe Mullins MD [facility] [phone] [facility]      24 Hour Appointment Hotline       To make an appointment at any [facility], call [phone] ([phone]). If you don't have a family doctor or clinic, we will help  you find one. [facility] are conveniently located to serve the needs of you and your family.             Review of your medicines      START taking        Dose / Directions Last dose taken    trimethoprim-polymyxin b ophthalmic solution   Commonly known as:  POLYTRIM   Dose:  1 drop   Quantity:  1 Bottle        Apply 1 drop to eye every 4 hours for 7 days   Refills:  0          Our records show that you are taking the medicines listed below. If these are incorrect, please call your family doctor or clinic.        Dose / Directions Last dose taken    ACCU-CHEK JUANY PLUS test strip   Generic drug:  blood glucose monitoring        Refills:  0        ASPIRIN PO   Dose:  81 mg        Take 81 mg by mouth daily   Refills:  0        donepezil 5 MG tablet   Commonly known as:  ARICEPT   Dose:  5 mg   Quantity:  30 tablet        Take 1 tablet (5 mg) by mouth At Bedtime   Refills:  3        escitalopram 10 MG tablet   Commonly known as:  LEXAPRO        Refills:  0        * estradiol 0.05 MG/24HR BIW patch   Commonly known as:  VIVELLE-DOT   Dose:  1 patch        Place 1 patch onto the skin twice a week   Refills:  0        * estradiol 0.0375 MG/24HR BIW patch   Commonly known as:  VIVELLE-DOT        Refills:  0        GEMFIBROZIL PO   Dose:  600 mg        Take 600 mg by mouth daily   Refills:  0        LIVALO PO   Dose:  4 mg        Take 4 mg by mouth daily   Refills:  0        METOPROLOL SUCCINATE ER PO   Dose:  50 mg        Take 50 mg by mouth daily   Refills:  0        OVER-THE-COUNTER        allertec ( antihystamine) 10 mg daily   Refills:  0        PROGESTERONE MICRONIZED PO   Dose:  200 mg        Take 200 mg by mouth 10 days a month   Refills:  0        VITAMIN D (CHOLECALCIFEROL) PO   Dose:  11402 Units        Take 50,000 Units by mouth once a week   Refills:  0        * Notice:  This list has 2 medication(s) that are the same as other medications prescribed for you. Read the directions carefully, and ask your  doctor or other care provider to review them with you.            Prescriptions were sent or printed at these locations (1 Prescription)                   [id number] [place], [place] - [address]   [address] [address]    Telephone:  [phone]   Fax:  [phone]   Hours:                  E-Prescribed (1 of 1)         trimethoprim-polymyxin b (POLYTRIM) ophthalmic solution                Orders Needing Specimen Collection     None      Pending Results     No orders found from 2/19/2017 to 2/22/2017.            Pending Culture Results     No orders found from 2/19/2017 to 2/22/2017.             Test Results from your hospital stay            Clinical Quality Measure: Blood Pressure Screening     Your blood pressure was checked while you were in the emergency department today. The last reading we obtained was  BP: 175/89 . Please read the guidelines below about what these numbers mean and what you should do about them.  If your systolic blood pressure (the top number) is less than 120 and your diastolic blood pressure (the bottom number) is less than 80, then your blood pressure is normal. There is nothing more that you need to do about it.  If your systolic blood pressure (the top number) is 120-139 or your diastolic blood pressure (the bottom number) is 80-89, your blood pressure may be higher than it should be. You should have your blood pressure rechecked within a year by a primary care provider.  If your systolic blood pressure (the top number) is 140 or greater or your diastolic blood pressure (the bottom number) is 90 or greater, you may have high blood pressure. High blood pressure is treatable, but if left untreated over time it can put you at risk for heart attack, stroke, or kidney failure. You should have your blood pressure rechecked by a primary care provider within the next 4 weeks.  If your provider in the emergency department today gave you specific instructions to  follow-up with your doctor or provider even sooner than that, you should follow that instruction and not wait for up to 4 weeks for your follow-up visit.        Thank you for choosing [facility]       Thank you for choosing [facility] for your care. Our goal is always to provide you with excellent care. Hearing back from our patients is one way we can continue to improve our services. Please take a few minutes to complete the written survey that you may receive in the mail after you visit with us. Thank you!        [employer] Information     [employer] gives you secure access to your electronic health record. If you see a primary care provider, you can also send messages to your care team and make appointments. If you have questions, please call your primary care clinic.  If you do not have a primary care provider, please call [phone] and they will assist you.        Care EveryWhere ID     This is your Care EveryWhere ID. This could be used by other organizations to access your [facility] medical records  [id number]        After Visit Summary       This is your record. Keep this with you and show to your community pharmacist(s) and doctor(s) at your next visit.

## 2017-02-21 NOTE — ED AVS SNAPSHOT
Emergency Department    [address] [place] [address]    Phone:  [phone]    Fax:  [phone]                                       Elise Bernard   MRN: [id number]    Department:   Emergency Department   Date of Visit:  2/21/2017           After Visit Summary Signature Page     I have received my discharge instructions, and my questions have been answered. I have discussed any challenges I see with this plan with the nurse or doctor.    ..........................................................................................................................................  Patient/Patient Representative Signature      ..........................................................................................................................................  Patient Representative Print Name and Relationship to Patient    ..................................................               ................................................  Date                                            Time    ..........................................................................................................................................  Reviewed by Signature/Title    ...................................................              ..............................................  Date                                                            Time

## 2017-02-21 NOTE — DISCHARGE INSTRUCTIONS
Chemical Exposure: Eye    A chemical exposure, or injury, to the eye can occur when an acidic or basic solution comes in contact with the eye. As soon as the chemical exposure occurs, it is very important to immediately irrigate and flush your eye with sterile saline solution. If sterile saline is unavailable to you immediately, then flushing with tap water is an acceptable alternative. The effects of a chemical exposure can range from mild irritation to permanent scarring and vision loss. The amount of injury to your eye depends on what type of chemical it was, how concentrated it was, whether it was an acidic or basic substance, and how long it was in your eye. After flushing the eye, it is important to follow up with a doctor if you experience any vision blurriness or pain.  It is common to have some irritation for the next 24 hours, even in mild cases. If the exposure was more serious, be sure to follow up as directed.  The pressure inside of the eye can increase hours to days after a chemical eye injury (glaucoma). This requires prompt treatment. Watch for the symptoms described below.  Home care    A cold pack (ice in a plastic bag, wrapped in a towel) may be applied over the eye for 20 minutes at a time. This will reduce pain.    Eye drops may be prescribed to reduce irritation, inflammation, and risk of infection. If no drops were prescribed, you may use over-the-counter decongestant eye drops for irritation or redness.    You may use acetaminophen or ibuprofen to control pain, unless another medicine was prescribed. (Note: If you have chronic liver or kidney disease or have ever had a stomach ulcer or gastrointestinal bleeding, talk with your doctor before using these medicines.)    If an eye patch was applied:    You may place the cold pack over the eye patch.    If you were given a follow-up appointment for patch removal and re-exam, do not miss it. An eye patch should not be left in place for more than  48 hours, unless you are advised to do so by your healthcare provider.    Do not drive a motor vehicle or use machinery with the eye patch in place. It is difficult to  distance with only one eye.  Follow-up care  Follow up with your healthcare provider, or as directed.  When to seek medical advice  Call your health care provider right away if any of these occur.    Increased eyelid swelling    Increasing pain in the eye    Increasing redness or drainage from the eye    Failure of normal vision to return within 24 to 48 hours    Continued feeling that something is in your eye, lasting more than 48 hours    [id number] [employer]. [address] [place] [address]. All rights reserved. This information is not intended as a substitute for professional medical care. Always follow your healthcare professional's instructions.

## 2017-02-22 NOTE — DISCHARGE INSTRUCTIONS
"Discharge Instructions  Conjunctivitis  Conjunctivitis, or \"pinkeye\", is inflammation of the conjunctiva which is the thin membrane that lines the inner surface of the eyelids and the whites of the eyes.   There are four main types of conjunctivitis: viral, bacterial, allergic, and non-specific. Both bacterial and viral conjunctivitis spread easily from one person to another by contact with the eye or another person s hands, by an object the infected person has touched, such as a door handle, or by sharing an object that has touched their eye such as a towel or pillow case. Because of this, children with bacterial conjunctivitis can t go back to school or  until they have been on antibiotic drops for 24 hours.  VIRAL CONJUNCTIVITIS:  This is typically caused by the virus that also causes the common cold and is often seen as part of a general cold.  This type of conjunctivitis is not treated with antibiotics, and usually lasts 3 - 5 days.  An over the counter antihistamine/decongestant eye drop may help to relieve the itching and irritation of viral conjunctivitis.  BACTERIAL CONJUNCTIVITIS:  This is treated with an antibiotic ointment or eye drop.  In both bacterial and viral conjunctivitis, do not wear contact lenses until your eye is no longer red.   Your contact case should be thrown away and the contacts disinfected overnight, or replaced if disposable.  NON SPECIFIC CONJUNCTIVITIS: Sometimes a red eye is caused by other things such as dry eye, chemical exposure, or foreign body in the eye such as dust or eyelash.   All of these problems generally improve on their own within 24 hours.  ALLERGIC CONJUNCTIVITIS: These are eye symptoms caused by allergies. This type of conjunctivitis will be treated with allergy medications.    Return to the Emergency Department if:    If you have blurry vision.    If you have increasing eye pain or drainage.    If you have redness or swelling in the skin around the " eye.    If you have a fever.    Follow-up with your doctor:      Your eye should improve within 2 days, if it does not, return to the Emergency Department or see your regular doctor for a second eye exam.  If you were given a prescription for medicine here today, be sure to read all of the information (including the package insert) that comes with your prescription.  This will include important information about the medicine, its side effects, and any warnings that you need to know about.  The pharmacist who fills the prescription can provide more information and answer questions you may have about the medicine.  If you have questions or concerns that the pharmacist cannot address, please call or return to the Emergency Department.

## 2017-02-22 NOTE — ED PROVIDER NOTES
History     Chief Complaint:  Eye Problem     HPI The history is limited due to the patient's dementia and is obtained primarily through her .     Elise Bernard is a 66 year old female with a history of dementia who presents accompanied by her  for evaluation of an eye problem. The patient has a history of dementia and her  reports that she has a history of thinking that there is a contact lens stuck in her eye when there is none, but she can only be convinced of this when told so by a medical provider. Today, the patient started to develop the sensation of having a contact lens stuck in her right eye, prompting her  to bring her into the ED for evaluation of this.     Notably, the patient was here in the ED yesterday after putting 1-2 drops of Clear Care contact solution in each of her bilateral eyes. She did have considerable redness and pain in both of her eyes following this, and here eyes were irrigated with normal saline via a Lico lens in the ED. Her  does report that her eyes were looking improved, and then this evening her right eye appears to be more red.    Allergies:  Vicodin      Medications:    estradiol (VIVELLE-DOT) 0.0375 MG/24HR BIW patch  donepezil (ARICEPT) 5 MG tablet  escitalopram (LEXAPRO) 10 MG tablet  ASPIRIN PO  VITAMIN D, CHOLECALCIFEROL, PO  METOPROLOL SUCCINATE ER PO  Pitavastatin Calcium (LIVALO PO)  GEMFIBROZIL PO  estradiol (VIVELLE-DOT) 0.05 MG/24HR patch  PROGESTERONE MICRONIZED PO    Past Medical History:    Dementia  Diabetes   Hypertension  Hyperlipidemia     Past Surgical History:    Vaginal delivery  Adenoidectomy  Tonsillectomy  Breast reduction  Colonoscopy  Tympanoplasty     Family History:    Heart disease - Father     Social History:  Tobacco use:    Never smoker  Alcohol use:    Positive  Marital status:       Accompanied to ED by:        Review of Systems   Unable to perform ROS: Dementia     Physical Exam   First  Vitals:  BP: 175/89  Heart Rate: 103  Temp: 97.5  F (36.4  C)  Resp: 16  Weight: 60.8 kg (134 lb)  SpO2: 98 %      Physical Exam  General: Resting comfortably on the gurney.    Head:  The scalp, head and face appear normal. No evidence of trauma.   ENT:  Pupils are equal, round and reactive to light. EOM intact.     Left eye there is mild conjunctival injection, no drainage.     Right eye there is more prominent conjunctival injection with tearing and yellow crust to the upper eye lashes.     No contact in either eye. No orbital erythema, tenderness or swelling.   Neck:  Supple, no rigidity noted. Normal ROM.     Lymph: No anterior or posterior cervical lymphadenopathy noted.   Resp:  Non-labored breathing. No tachypnea.    MS:  Normal muscular tone.   Neuro: Awake and alert. Speech is clear.   Skin:  No rash or pallor.  Psych:  Demented, singing and repeating phrases, but interactive and cooperative.     Emergency Department Course     Emergency Department Course:  Nursing notes and vitals reviewed.  2003: I performed an exam of the patient as documented above. Proparacaine drops administered.     Findings and plan explained to the Patient and spouse. Patient discharged home with instructions regarding supportive care, medications, and reasons to return. The importance of close follow-up was reviewed. The patient was prescribed Polytrim ophthalmic solution.      Impression & Plan      Medical Decision Making:  Elise Bernard is a 66 year old female who presents for evaluation of a red eye.  A broad differential diagnosis was considered including bacterial conjunctivitis, viral conjunctivitis, foreign body, corneal abrasion, chemical vs allergic conjunctivitis, corneal ulcer, HSV, herpes zoster opthalmicus, endopthalmitis, orbital cellulitis, etc. This may be continued irritation from her visit yesterday or she may have caused a small corneal abrasion either with irrigation yesterday or today when trying to  remove a contact.  However, I suspect a conjunctivitis has developed, either from one of those processes or on its own, due to the drainage/ crusting and diffuse redness of the eye. Will start antibiotics and have close follow-up of eye physician. Fluorescein staining was not done due to difficult exam and patient discomfort with her dementia. No signs of significant injury and if there is a small corneal abrasion the treatment is the same.No red flag symptoms to suggest any of the above worrisome etiologies.  I discussed the results, plan and any additional questions with the patient's . He verbalized understanding and agreement with the plan.     Diagnosis:    ICD-10-CM    1. Acute conjunctivitis of right eye, unspecified acute conjunctivitis type H10.31     suspect associated corneal abrasion       Disposition:  Discharged to home with Polytrim ophthalmic solution.     Discharge Medications:  New Prescriptions    TRIMETHOPRIM-POLYMYXIN B (POLYTRIM) OPHTHALMIC SOLUTION    Apply 1 drop to eye every 4 hours for 7 days     Jakob WILLS, am serving as a scribe at 8:03 PM on 2/21/2017 to document services personally performed by Christal Harrison PA-C, based on my observations and the provider's statements to me.     EMERGENCY DEPARTMENT       Christal Harrison PA-C  02/23/17 0059

## 2017-02-27 ENCOUNTER — TELEPHONE (OUTPATIENT)
Dept: NEUROLOGY | Facility: CLINIC | Age: 67
End: 2017-02-27

## 2017-02-27 DIAGNOSIS — R41.3 MEMORY LOSS: Primary | ICD-10-CM

## 2017-02-28 ENCOUNTER — THERAPY VISIT (OUTPATIENT)
Dept: NEUROLOGY | Facility: CLINIC | Age: 67
End: 2017-02-28

## 2017-02-28 ENCOUNTER — OFFICE VISIT (OUTPATIENT)
Dept: NEUROLOGY | Facility: CLINIC | Age: 67
End: 2017-02-28

## 2017-02-28 VITALS
WEIGHT: 138.6 LBS | DIASTOLIC BLOOD PRESSURE: 79 MMHG | HEART RATE: 87 BPM | SYSTOLIC BLOOD PRESSURE: 147 MMHG | BODY MASS INDEX: 23.66 KG/M2 | HEIGHT: 64 IN

## 2017-02-28 DIAGNOSIS — R41.89 COGNITIVE IMPAIRMENT: Primary | ICD-10-CM

## 2017-02-28 RX ORDER — TRAZODONE HYDROCHLORIDE 50 MG/1
25 TABLET, FILM COATED ORAL AT BEDTIME
COMMUNITY
Start: 2017-02-24

## 2017-02-28 NOTE — PROGRESS NOTES
02/28/17 1100   Quick Adds   Type of Visit Initial Outpatient Occupational Therapy Evaluation   General Information   Start Of Care Date 02/28/17   Referring Physician Dr. Giuseppe Mullins   Orders Evaluate and treat as indicated   Other Orders Cognitive Performance Test   Orders Date 02/27/17   Medical Diagnosis Unknown   Onset of Illness/Injury or Date of Surgery 01/17/17   Precautions/Limitations Other (see comments)  (Left arm is fractured at the elbow from a fall Jan. 2016.)   Surgical/Medical History Reviewed No  (unavailable)   Additional Occupational Profile Info/Pertinent History of Current Problem Client often sings her answers to questions.  Client is unaware of her cognitive disability and was therefore unable to share any concerns related to her memory and/or concentration.   stated she reads a lot and takes the dog for a walk (either alone or with the CNA).  A CNA from [employer] (company) comes during the day for about 4 hours to stay with the client while her  works.  She is home alone for the other 4 hours he is gone at work.  She and the CNA may go to the health club to work out, may go out to lunch or out for breakfast, or to [employer] to walk around and allow client to make purchases.  Client has a tendency to buy the same things in excess over the span of a week (i.e., spouse shared that they now have 5 gallons of milk in their refrigerator).  This behavior is allowed d/t client's increased agitation with not being allowed to act on her impulses.  Client likes to watch the [employer] play baseball.  The couple has a time share in [place] which they travel to frequently and plan to move to permanently.  Client has been known to leave and go to the store by herself.  When asked if he was concerned about this, client stated that they had an agreement that he would never place her into a residence away from home so this is how they live now.  Client stated satisfaction with her current level of  activity.   Comments/Observations Client arrived with her  Johnie ( 20 years).   Role/Living Environment   Current Community Support Family/friend caregiver;Other/Comments  (CNA)   Patient role/Employment history Unemployed   Community/Avocational Activities see above   Current Living Environment House  (w/spouse & their dog)   Role/Living Environment Comments Client can help clean with a family member and is reportedly independent with all ADLs at this time.   Patient/family Goals Statement To get help determining client's diagnosis so they can seek treatment that may be beneficial.   Pain   Patient currently in pain Denies   Fall Risk Screen   Fall screen completed by OT   Have you fallen 2 or more times in the last year? No   Have you fallen and had an injury in the past year? Yes   Is the patient a fall risk? No   Comments Client fell while getting out of the car in January and broke her left elbow.  Client refuses to wear a sling and/or wrap as prescribed by her doctor.   Cognitive Status Examination   Orientation (N/T)   Level of Consciousness Alert   Follows Commands and Answers Questions 75% of the time  (Often sings answers back in a non-sensical way.)   Personal Safety and Judgment Impaired   Memory Impaired   Memory Comments Client's spouse started noticing things about 1.5 years ago.  At that time, client was reportedly getting lost while driving in local/familiar areas alone and would call her spouse to have him help her.  Also, friends started calling to let spouse know client was meeting them at the wrong places (other than where they had previously agreed to meet).  She was also having difficulty managing the finances independently (i.e., making double payments on one and leaving another unpaid causing the account to become overdrawn).  Client has increased confusion and repeatedly stated that she needs to have contacts (even though her PCP has stated that she is unable to safely wear  them).  They have been seeking help for her for about a year and reportedly still do not have a diagnosis.  Spouse shared that they have had extensive (10-day) testing done at the [facility] in [place] and some neurology testing done through the  of  as well.   Attention Distractible during evaluation;Sustained attention impaired;Selective attention impaired, difficulty ignoring irrelevant stimuli;Alternating attention impaired, difficulty shifting between tasks;Divided attention impaired, difficulty with simultaneous tasks   Organization/Problem Solving Sequencing impaired;Problem solving impaired   Executive Function Impulsive;Cognitive flexibility impaired;Planning ability impaired;Self awareness/monitoring impaired;Working memory impaired, decreased storage of information for performing tasks   Cognitive Comment Client demonstrated repeating (of conversations) throughout this session.  She also seemed to lack self awareness/self monitoring and often repeated back (in song) comments that were said to her, raised her arms out to her sides in abduction with palms up, extended the middle finger in gesture with either one or both hands).   Visual Perception   Visual Perception Wears glasses  (Wants contact lenses, but is unable to safely wear them.)   Functional Mobility   Ambulation WNL   Toileting   Toileting Comments Spouse commented that client is starting to lose bladder continence.   Activity Tolerance   Activity Tolerance good   Instrumental Activities of Daily Living Assessment   IADL Assessment/Observations Client is no longer driving.  Client stopped last August d/t getting lost in familiar places more and more.  Client is on a complex med. regime which her CNA helps set up and administer each day.  Spouse took over the finances about 1 year ago (d/t previously mentioned errors).  Spouse makes the majority of the meals but says client can heat up a frozen pizza in the oven.  This is reportedly the  only cooking the client has done within the last year per spouse.   Planned Therapy Interventions   Planned Therapy Interventions IADL training;Cognitive performance testing  (same day)   Adult OT Eval Goals   OT Eval Goals (Adult) 1   OT Goal 1   Goal Description Client and her spouse will verbalize comprehension of various educational strategies discussed today.  This goal was met by spouse only.  Client did not state any specific goals at this time due to reduced insight.   Clinical Impression   Criteria for Skilled Therapeutic Interventions Met Yes, treatment indicated   OT Diagnosis mild to moderate cognitive impairment   Influenced by the following impairments decreased social skills, decreased ability to intelligibly converse with others, increased dependence with all IADLs.   Assessment of Occupational Performance 5 or more Performance Deficits   Identified Performance Deficits social skills, home management, driving, meal prep., medication mgmt., financial mgmt., lack of independence, lack of safety awareness, etc.   Clinical Decision Making (Complexity) Moderate complexity   Therapy Frequency 1-2 sessions   Risks and Benefits of Treatment have been explained. Yes   Patient, Family & other staff in agreement with plan of care Yes   Clinical Impression Comments Client and spouse would benefit from additional (more comprehensive) skilled Occupational Therapy services to follow-up on today's educational instruction and also to assess needed modifications and/or progressions to these newly learned strategies in order to allow client/family to increase safety awareness and independence within client's current cognitive limitations.   Education Assessment   Barriers To Learning Emotional;Cognitive;Language   Preferred Learning Style Listening;Demonstration   Total Evaluation Time   Total Evaluation Time 40

## 2017-02-28 NOTE — MR AVS SNAPSHOT
After Visit Summary   2/28/2017    Elsie Bernard    MRN: [id number]           Patient Information     Date Of Birth          1950        Visit Information        Provider Department      2/28/2017 12:00 PM Cyn Johnson OT [facility]         Follow-ups after your visit        Your next 10 appointments already scheduled     Mar 28, 2017  4:30 PM CDT   Return Visit with Giuseppe Mullins MD   [facility] ([facility])    [address] [address] [phone]              Future tests that were ordered for you today     Open Future Orders        Priority Expected Expires Ordered    MR Brain - With and Without Contrast Routine  4/29/2017 2/28/2017            Who to contact     Please call your clinic at [phone] to:    Ask questions about your health    Make or cancel appointments    Discuss your medicines    Learn about your test results    Speak to your doctor   If you have compliments or concerns about an experience at your clinic, or if you wish to file a complaint, please contact [facility] Patient Relations at [phone] or email us at [email or web address]         Additional Information About Your Visit        MyChart Information     [employer] gives you secure access to your electronic health record. If you see a primary care provider, you can also send messages to your care team and make appointments. If you have questions, please call your primary care clinic.  If you do not have a primary care provider, please call [phone] and they will assist you.      [employer] is an electronic gateway that provides easy, online access to your medical records. With [employer], you can request a clinic appointment, read your test results, renew a prescription or communicate with your care team.     To access your existing account, please contact your [facility] or call  [phone] for assistance.        Care EveryWhere ID     This is your Care EveryWhere ID. This could be used by other organizations to access your [facility] medical records  [id number]         Blood Pressure from Last 3 Encounters:   02/28/17 147/79   02/21/17 175/89   02/20/17 166/74    Weight from Last 3 Encounters:   02/28/17 138 lb 9.6 oz (62.9 kg)   02/21/17 134 lb (60.8 kg)   02/20/17 121 lb (54.9 kg)              Today, you had the following     No orders found for display       Primary Care Provider Office Phone # Fax #    Myles Birmingham [phone]       [facility] [address]  [place] [address]        Thank you!     Thank you for choosing [facility]  for your care. Our goal is always to provide you with excellent care. Hearing back from our patients is one way we can continue to improve our services. Please take a few minutes to complete the written survey that you may receive in the mail after your visit with us. Thank you!             Your Updated Medication List - Protect others around you: Learn how to safely use, store and throw away your medicines at www.disposemymeds.org.          This list is accurate as of: 2/28/17  3:11 PM.  Always use your most recent med list.                   Brand Name Dispense Instructions for use    ACCU-CHEK JUANY PLUS test strip   Generic drug:  blood glucose monitoring          ASPIRIN PO      Take 81 mg by mouth daily       donepezil 5 MG tablet    ARICEPT    30 tablet    Take 1 tablet (5 mg) by mouth At Bedtime       escitalopram 10 MG tablet    LEXAPRO         * estradiol 0.05 MG/24HR BIW patch    VIVELLE-DOT     Place 1 patch onto the skin twice a week       * estradiol 0.0375 MG/24HR BIW patch    VIVELLE-DOT         GEMFIBROZIL PO      Take 600 mg by mouth daily       LIVALO PO      Take 4 mg by mouth daily       METOPROLOL SUCCINATE ER PO      Take 50 mg by mouth daily       OVER-THE-COUNTER      allertec (  antihystamine) 10 mg daily       PROGESTERONE MICRONIZED PO      Take 200 mg by mouth 10 days a month       traZODone 50 MG tablet    DESYREL     Take 50 mg by mouth At Bedtime       trimethoprim-polymyxin b ophthalmic solution    POLYTRIM    1 Bottle    Apply 1 drop to eye every 4 hours for 7 days       VITAMIN D (CHOLECALCIFEROL) PO      Take 50,000 Units by mouth once a week       * Notice:  This list has 2 medication(s) that are the same as other medications prescribed for you. Read the directions carefully, and ask your doctor or other care provider to review them with you.

## 2017-02-28 NOTE — PROGRESS NOTES
02/28/17 1400   Signing Clinician's Name / Credentials   Signing clinician's name / credentials Cyn Johnson, OTR/L   OT Goal 1   Goal Description Client and her spouse will verbalize comprehension of various educational strategies discussed today.  This goal was met by spouse only.  Client did not state any specific goals at this time due to reduced insight.   Therapeutic Interventions   Therapeutic Interventions Self Care/Home Management   Self Care/Home Management   Minutes 15 Minutes   Skilled Intervention Client and her spouse were provided with written caregiving materials for level 4.5 and verbal instruction in specific therapeutic interventions:  1.) Educated in implementing frequent check-in support to 24-hour care and/or assisted living options either at home or in a facility that could provide this for her (with all IADL areas d/t decreased safety awareness); 2.) benefits of continued involvement in physical and cognitively stimulating activities (reading, puzzles, word/number games), as well as social, and emotional support; 3.) benefits of maintaining a daily routine to encourage success with daily activities; 4.) educated in necessity of supervising all hazardous activities (steve. any cooking, med. mgmt., walks and/or outings); 5.) educated in benefits of regular involvement in social activities and/or a structured adult day programs.   Assessments Completed   Assessments Completed OT Eval. and Cognitive Performance Test   Education   Learner Patient;Significant other   Readiness Acceptance  (Indifference (24-hour supervision))   Method Booklet/handout;Literature;Explanation   Response Verbalizes understanding;Needs reinforcement   Communication with other professionals   Communication with other professionals Dr. Mullins   Plan   Plan for next session In addition to OT Treatment (upon their return from [place]), client and her spouse would benefit from follow-up Cognitive Performance Testing one year  from now to ensure that the client's most immediate needs are continually being met.   Comments   Comments Client's spouse was reluctant to increase client's daily CNA services/care (did not specify why at this time) or work with a  (d/t poor  relations on 2 other separate occasions) and also declined looking into Adult Day Prog. services at this time.   Total Session Time   Total Session Time 95

## 2017-02-28 NOTE — PROGRESS NOTES
OCCUPATIONAL THERAPY COGNITIVE PERFORMANCE EVALUATION  [facility] [place]  Cognitive Performance Test    SUMMARY OF TEST:    The Cognitive Performance Test (CPT) is a standardized performance-based assessment to measure working memory/executive function processing capacities that underlie functional performance. Subtasks include common basic and instrumental activities of daily living (ADL/IADL) which are rated based on the manner in which patients respond to task demands of varying complexity. The total CPT score describes a level of functioning that indicates how information is processed, implications for functional activities, potential safety risks and a recommended level of supervision or assist based on cognitive function. The highest total score on this test is in the range of 5.6 to 5.8.    DATE OF TESTIN2017    RESULTS OF TESTING:     Client demonstrated impulsivity with each testing task and often needed to be cued to focus until directions were fully given before initiating tasks.  Client did not ask for repeat of instructions, but did need either repeating of part of the directions and/or cues to continue with task when she got distracted from finishing a task.                                                                                  CPT Subtest Results    MEDBOX: 4.5/6 SHOP/GLOVES: 4.0/6 PHONE: 3.5/6   WASH:  5.0/5 TRAVEL: 6.0/6 TOAST: 5.0/5   DRESS: 4.0/5   TOTAL CPT SCORE:  32.0/39.0     Average CPT Score  4.6/5.6    INTERPRETATION OF TEST RESULTS:    Based on the Cognitive Performance Test, this patient scored at CPT Level 4.6.  See CPT Levels reference below.    Summary of functional cognitive status:   It appears that the client may be performing tasks that are above her ability level to competently and safely complete (i.e., staying home alone for up to 4 hours while spouse is at work, going for walks alone, walking to the store alone, shopping/spending money even with  assistance from CNA).  Increased assistance is recommended with all IADLs and monitoring of hazardous activities in the home (steve. Any cooking, any traveling, and spending of money).  Overall, she does appear to function at level 4.6 CPT behaviors (or lower) in her daily IADL and ADL activities.  Her performance today indicates a need for increased assistance in IADLs with increased supervision and monitoring of ADLs.  Recommended frequent check-in support to 24-hour care for adequate safety with IADLs d/t her extremely vulnerable cognitive state.  Spouse was encouraged to provide more long-term and consistent support to better monitor daily activities throughout the day (steve. while he is at work) to ensure continued safety and adequacy of task performance in all IADL and ADL areas.    Factors affecting performance:  Possible speech/language impairment  Impulsive actions    Recommendations:    Supervision in living settin hour:  Especially with outings (walks, trips to store, spending money/making purchases, etc.), meals, medication management, and transportation.                                                    TIME ADMINISTERING TEST: 20 minutes    TIME FOR INTERPRETATION AND PREPARATION OF REPORT: 20 minutes    TOTAL TIME: 40 minutes      CPT Levels Reference:    Patient's Average CPT Score:  4.5                                                                                                                                                  Individual scores range along a continuum as outlined below.  In addition to cognitive status, other factors may affect safety in a home environment.  Please refer to specific recommendations for this patient.    ___5.6-5.8  Normal functioning (absence of cognitive-functional disability).  Independent in managing personal affairs, monitors and directs own behavior.  Uses complex information to carry out daily activities with safety and accuracy.    Proficient with  "instrumental activities of daily living (IADL) and learning new activity.  Problems are anticipated, errors are avoided, and consequences of actions are considered.      ___5.0   Mild cognitive-functional disability; deficits in working memory and executive thought processes. Difficulty using complex information. Problems may be observed with recent memory, judgment, reasoning and planning ahead. May be impulsive or have difficulty anticipating consequences.  Safety:  May require assistance to plan ahead; or to manage complex medication schedules, appointments or finances.  Hazardous activities may need to be monitored or limited.  ADL:  Mild functional decline.  Able to complete basic self-care and routine household tasks.  May have difficulty with complex daily tasks such as reading, writing, meal preparation, shopping or driving.   Learns through hands on teaching. Self-centered behavior or difficulty considering the needs of others may be seen related to trouble seeing the  whole picture\". Can appear disorganized or uninhibited.    _X_4.5  Mild to moderate cognitive-functional disability. Significant deficits in working memory and executive thought processes. Judgment, reasoning and planning show obvious impairment.  Distractible with inability to shift attention/actions given competing stimuli.  Difficulty with problem solving and managing details. Complex daily tasks performed with inconsistency, difficulty, or error.     Safety:  Medications should be monitored, stove use may require supervision, and driving ability may be affected.  Impaired safety awareness with inability to anticipate potential problems.  May not recognize or respond to emergent situations. Requires frequent check-in support.   ADL:  Mild difficulty with simple everyday self-care tasks. Benefits from structured, routine activity.  Will likely need reminders to complete tasks outside of the routine. Requires assistance with planning and " IADL tasks like shopping and finances. Learns concrete tasks through repetition, but performance may not generalize. Tends to be impulsive with poor insight. Self centered behavior or inability to consider the needs of others is common.    _X_4.0  Moderate cognitive-functional disability; abstract to concrete thought processes. Working memory and executive function impairments are obvious. Difficulty with planning and problem solving.  Behavior is goal-directed, but unable to follow multi-step directions, is easily distracted, and may not recognize mistakes.  Inability to anticipate hazards or understand precautions.  Safety:  Recommend 24-hour supervision for safety. Supervision needed for medication management and for hazardous activities. May not be able to follow a restricted diet. Can get lost in unfamiliar surroundings. Generally, persons functioning at level 4 should not be driving.   ADL:  Some decline in quality or frequency of ADL.  [place] enhanced by use of a routine, simple concrete directions, and caregiver set-up of needed items. Complex tasks such as money or home management typically requires assistance.  Relies heavily on vision to guide behavior; will ignore objects/hazards not in plain sight and can be distracted by irrelevant objects. Often has poor insight.  Able to carry out social conversation and may verbally  cover  for deficits leading caregivers to believe they are capable of functioning independently.       ___3.5  Moderate cognitive-functional disability; increased cues needed for task completion. Aware of concrete task steps but needs prompting or cues to initiate and complete simple tasks. Attention span is limited, simple directions may need to be repeated, and re-focus to a topic or task may be required.  Safety:  24-hour supervision required for safety and for assistance with daily tasks. Assistance required with medications, and access to medication should be limited. Meals,  nutrition and dietary restrictions need to be monitored.  All hazardous activities should be restricted or supervised. Should not drive. Prone to wandering and can become lost.  ADL:  Moderate functional decline. Familiar tasks usually requires set-up of supplies and directions to complete steps. May need objects handed to them for task initiation. Function best with a set schedule in familiar surroundings with familiar people. All complex tasks must be done by others. Vocabulary is diminished and speech often unfocused.

## 2017-02-28 NOTE — PROGRESS NOTES
[facility] OT CHARGES  96803 (40 minutes) - OT Eval.  33361 (40 minutes including documentation) - CPT  77880 (15 minutes) - OT Tx  TOTAL = 95 minutes  OT Dx - Mild to moderate cognitive impairment

## 2017-03-26 ENCOUNTER — APPOINTMENT (OUTPATIENT)
Dept: CT IMAGING | Facility: CLINIC | Age: 67
End: 2017-03-26
Attending: EMERGENCY MEDICINE
Payer: COMMERCIAL

## 2017-03-26 ENCOUNTER — HOSPITAL ENCOUNTER (EMERGENCY)
Facility: CLINIC | Age: 67
Discharge: HOME | End: 2017-03-26
Attending: EMERGENCY MEDICINE | Admitting: EMERGENCY MEDICINE
Payer: COMMERCIAL

## 2017-03-26 VITALS
HEIGHT: 64 IN | RESPIRATION RATE: 10 BRPM | TEMPERATURE: 97.5 F | DIASTOLIC BLOOD PRESSURE: 92 MMHG | BODY MASS INDEX: 20.49 KG/M2 | WEIGHT: 120 LBS | OXYGEN SATURATION: 99 % | SYSTOLIC BLOOD PRESSURE: 153 MMHG

## 2017-03-26 DIAGNOSIS — S80.00XA CONTUSION OF KNEE, UNSPECIFIED LATERALITY, INITIAL ENCOUNTER: ICD-10-CM
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
## 2017-03-26 LAB
ANION GAP SERPL CALCULATED.3IONS-SCNC: 7 MMOL/L (ref 3–14)
BASOPHILS # BLD AUTO: 0 10E9/L (ref 0–0.2)
BASOPHILS NFR BLD AUTO: 0.3 %
BUN SERPL-MCNC: 13 MG/DL (ref 7–30)
CALCIUM SERPL-MCNC: 8.5 MG/DL (ref 8.5–10.1)
CHLORIDE SERPL-SCNC: 104 MMOL/L (ref 94–109)
CO2 SERPL-SCNC: 26 MMOL/L (ref 20–32)
CREAT SERPL-MCNC: 0.7 MG/DL (ref 0.52–1.04)
DIFFERENTIAL METHOD BLD: NORMAL
EOSINOPHIL # BLD AUTO: 0.1 10E9/L (ref 0–0.7)
EOSINOPHIL NFR BLD AUTO: 1.8 %
ERYTHROCYTE [DISTWIDTH] IN BLOOD BY AUTOMATED COUNT: 12 % (ref 10–15)
ETHANOL SERPL-MCNC: <0.01 G/DL
GFR SERPL CREATININE-BSD FRML MDRD: 84 ML/MIN/1.7M2
GLUCOSE SERPL-MCNC: 162 MG/DL (ref 70–99)
HCT VFR BLD AUTO: 39.9 % (ref 35–47)
HGB BLD-MCNC: 13.9 G/DL (ref 11.7–15.7)
IMM GRANULOCYTES # BLD: 0 10E9/L (ref 0–0.4)
IMM GRANULOCYTES NFR BLD: 0.2 %
INTERPRETATION ECG - MUSE: NORMAL
LYMPHOCYTES # BLD AUTO: 0.8 10E9/L (ref 0.8–5.3)
LYMPHOCYTES NFR BLD AUTO: 13.2 %
MCH RBC QN AUTO: 32 PG (ref 26.5–33)
MCHC RBC AUTO-ENTMCNC: 34.8 G/DL (ref 31.5–36.5)
MCV RBC AUTO: 92 FL (ref 78–100)
MONOCYTES # BLD AUTO: 0.4 10E9/L (ref 0–1.3)
MONOCYTES NFR BLD AUTO: 6.3 %
NEUTROPHILS # BLD AUTO: 4.7 10E9/L (ref 1.6–8.3)
NEUTROPHILS NFR BLD AUTO: 78.2 %
NRBC # BLD AUTO: 0 10*3/UL
NRBC BLD AUTO-RTO: 0 /100
PLATELET # BLD AUTO: 235 10E9/L (ref 150–450)
POTASSIUM SERPL-SCNC: 4.3 MMOL/L (ref 3.4–5.3)
RBC # BLD AUTO: 4.35 10E12/L (ref 3.8–5.2)
SODIUM SERPL-SCNC: 137 MMOL/L (ref 133–144)
TROPONIN I SERPL-MCNC: NORMAL UG/L (ref 0–0.04)
WBC # BLD AUTO: 6 10E9/L (ref 4–11)

## 2017-03-26 PROCEDURE — 84484 ASSAY OF TROPONIN QUANT: CPT | Performed by: EMERGENCY MEDICINE

## 2017-03-26 PROCEDURE — 93005 ELECTROCARDIOGRAM TRACING: CPT

## 2017-03-26 PROCEDURE — 70450 CT HEAD/BRAIN W/O DYE: CPT

## 2017-03-26 PROCEDURE — 99285 EMERGENCY DEPT VISIT HI MDM: CPT | Mod: 25

## 2017-03-26 PROCEDURE — 80320 DRUG SCREEN QUANTALCOHOLS: CPT | Performed by: EMERGENCY MEDICINE

## 2017-03-26 PROCEDURE — 80048 BASIC METABOLIC PNL TOTAL CA: CPT | Performed by: EMERGENCY MEDICINE

## 2017-03-26 PROCEDURE — 85025 COMPLETE CBC W/AUTO DIFF WBC: CPT | Performed by: EMERGENCY MEDICINE

## 2017-03-26 RX ORDER — OXYCODONE AND ACETAMINOPHEN 5; 325 MG/1; MG/1
TABLET ORAL EVERY 4 HOURS PRN
Status: ON HOLD | COMMUNITY
End: 2017-05-11

## 2017-03-26 ASSESSMENT — ENCOUNTER SYMPTOMS
SHORTNESS OF BREATH: 0
DIZZINESS: 1
DIAPHORESIS: 1
HEADACHES: 1

## 2017-03-26 NOTE — ED NOTES
Pt from home where she lives with spouse. Had two falls this AM, unwitnessed. Denies head, neck, or back pain. C/o bilateral knee pain. Is alert and disoriented at baseline d/t dementia.

## 2017-03-26 NOTE — ED AVS SNAPSHOT
Emergency Department    [address] [facility] [address]    Phone:  [phone]    Fax:  [phone]                                       Elise Bernard   MRN: [id number]    Department:   Emergency Department   Date of Visit:  3/26/2017           Patient Information     Date Of Birth          1950        Your diagnoses for this visit were:     Fall, initial encounter     Contusion of knee, unspecified laterality, initial encounter        You were seen by Laney Dhillon MD.      Follow-up Information     Follow up with Myles Birmingham MD.    Specialty:  Family Practice    Contact information:    [address] [facility]  [address] [facility] [address]  [facility] [address]  [phone]          Follow up with Myles Birmingham MD.    Specialty:  Family Practice    Why:  As needed    Contact information:    [facility] [facility]  [address] [address]  [phone]          Discharge Instructions       I will call you with the alcohol level.  Hide or get rid of the alcohol.     Uncertain Causes of Fall  You have had a fall today. But the cause of your fall is not certain. Falls can occur due to slipping, tripping or losing your balance. A fall can also occur from a fainting spell or seizure.  While a fall can happen for a simple reason (tripping over something), falls in elderly people are often caused by a combination of things:    Age-related decline in function with worsening balance, stability, vision, and muscle strength    Chronic illness such as heart arrhythmias, heart valve disease, vascular disease, COPD, diabetes, strokes, arthritis    Effects or side effects of medicines    Dehydration.    Environmental hazards such as uneven or slippery ground, unfamiliar place, obstacles, uneven surfaces, or slippery ground    Situational factors (related to the activity being done, e.g., rushing to the bathroom)  Because the cause of your fall today is not certain, it is  possible that a fainting spell or seizure was the cause. This means that it could happen again, without warning. If you fall again, without a cause, then you should return to this facility promptly to have further tests. Otherwise, follow up with your doctor as explained below.  It is normal to feel sore and tight in your muscles and back the next day, and not just the muscles you initially injured. Remember, all the parts of your body are connected, so while initially one area hurts, the next day another may hurt. Also, when you injure yourself, it causes inflammation, which then causes the muscles to tighten up and hurt more. After the initial worsening, it should gradually improve over the next few days. However, more severe pain should be reported.  Even without a definite head injury, you can still get a concussion. Concussions and even bleeding can still occur, especially if you have had a recent injury or take blood thinner medicine. It is not unusual to have a mild headache and feel tired and even nauseous or dizzy.  Home care    Rest today and resume your normal activities as soon as you are feeling back to normal. It is best to remain with someone who can check on you for the next 24 hours to watch for another episode of falling.    If you were injured during the fall, follow the advice from your doctor regarding care of your injury.     If you become light-headed or dizzy, lie down immediately or sit and lean forward with your head down.    As a precaution, do not drive a car or operate dangerous equipment, do not take a bath alone (use a shower instead) and do not swim alone until you see your doctor. A condition causing fainting or seizures must be ruled out before resuming these activities.    You may use acetaminophen or ibuprofen to control pain, unless another pain medicine was prescribed. If you have chronic liver or kidney disease or ever had a stomach ulcer or gastrointestinal bleeding, talk with  your doctor before using these medicines.    Keep your appointments for any further testing that may have been scheduled for you.  Follow-up care  Follow up with your healthcare provider, or as advised.  If X-rays or CT scan were done, you will be notified if there is a change in the reading, especially if it affects treatment.  Call 911  Call 911 if any of these occur:    Trouble breathing    Confused or difficulty arousing    Fainting or loss of consciousness    Rapid or very slow heart rate    Seizure    Difficulty with speech or vision, weakness of an arm or leg    Difficulty walking or talking, loss of balance, numbness or weakness in one side of your body, facial droop  When to seek medical advice  Call your healthcare provider right away if any of these occur:    Another unexplained fall    Dizziness    Severe headache    Nausea and vomiting    Blood in vomit, stools (black or red color)    9295-1475 [employer]. [address] [place] [address]. All rights reserved. This information is not intended as a substitute for professional medical care. Always follow your healthcare professional's instructions.          Bruises (Contusions)    A contusion is a bruise. A bruise happens when a blow to your body doesn't break the skin but does break blood vessels beneath the skin. Blood leaking from the broken vessels causes redness and swelling. As it heals, your bruise is likely to turn colors like purple, green, and yellow. This is normal. The bruise should fade in 2 or 3 weeks.  Factors that make you more likely to bruise  Almost everyone bruises now and then. Certain people do bruise more easily than others. You're more prone to bruising as you get older. That's because blood vessels become more fragile with age. You're also more likely to bruise if you have a clotting disorder such as hemophilia or take medications that reduce clotting, including aspirin.  When to go to the emergency room  (ER)  Bruises almost always heal on their own without special treatment. But for some people, a bad bruise can be serious. Seek medical care if you:    Have a clotting disorder such as hemophilia.    Have cirrhosis or other serious liver disease.    Take blood-thinning medications such as warfarin (Coumadin).  What to expect in the ER  A doctor will examine your bruise and ask about any health conditions you have. In some cases, you may have a test to check how well your blood clots. Other treatment will depend on your needs.  Follow-up care  Sometimes a bruise gets worse instead of better. It may become larger and more swollen. This can occur when your body walls off a small pool of blood under the skin (hematoma). In very rare cases, your doctor may need to drain excess blood from the area.  Tip:  Apply an ice pack or bag of frozen peas to a bruise (keep a thin cloth between the cold source and your skin). This can help reduce redness and swelling.     6922-5399 [employer]. [address] [place] [address]. All rights reserved. This information is not intended as a substitute for professional medical care. Always follow your healthcare professional's instructions.      Discharge Instructions  Discharge Instructions  Head Injury  f you are taking a blood thinner, such as aspirin, Pradaxa  (dabigatran), Coumadin  (warfarin), or Plavix  (clopidogrel), you are at especially high risk for immediate or delayed bleeding, and need to re-check with a physician in 24 hours  You have been seen today for a head injury. You were checked for serious problems, like bleeding on the brain, but these problems cannot always be found right away.  Due to this risk, you should not be alone for 24 hours after your injury.  Follow up with your regular physician as needed. I, or sooner if any of the symptoms below happen.     Return to the Emergency Department if:    You are confused, have amnesia, or you are not  acting right.    Your headache gets worse or you start to have a really bad headache even with your recommended treatment plan.    You vomit more than once.    You have a convulsion or seizure.    You have trouble walking.    You have weakness or paralysis in an arm or a leg.    You have blood or fluid coming from your ears or nose.    You have new symptoms or anything that worries you.    Sleeping:  It is okay for you to sleep, but someone should wake you up as instructed by your doctor, and someone should check on you at your usual time to wake up.     Activity:    Do not drive for at least 24 hours.    Do not drive if you have dizzy spells or trouble concentrating, or remembering things.    Do not return to any contact sports until cleared by your regular doctor.     Follow-up:  It is very important that you make an appointment with your clinic and go to the appointment.  If you do not follow-up with your regular doctor, it may result in missing an important development which could result in permanent injury or disability and/or lasting pain.  If there is any problem keeping your appointment, call your doctor or return to the Emergency Department.    MORE INFORMATION:    Concussion:  A concussion is a minor head injury that may cause temporary problems with the way your brain works.  Some symptoms include:  confusion, amnesia, nausea and vomiting, dizziness, fatigue, memory or concentration problems, irritability and sleep problems.    CT Scans: Your evaluation today may have included a CT scan (CAT scan) to look for things like bleeding or a skull fracture (break).  CT scans involve radiation and too many CT scans can cause serious health problems like cancer, especially in children.  Because of this, your doctor may not have ordered a CT scan today if they think you are at low risk for a serious or life threatening problem.    If you were given a prescription for medicine here today, be sure to read all of the  information (including the package insert) that comes with your prescription.  This will include important information about the medicine, its side effects, and any warnings that you need to know about.  The pharmacist who fills the prescription can provide more information and answer questions you may have about the medicine.  If you have questions or concerns that the pharmacist cannot address, please call or return to the Emergency Department.     Opioid Medication Information    Pain medications are among the most commonly prescribed medicines, so we are including this information for all our patients. If you did not receive pain medication or get a prescription for pain medicine, you can ignore it.     You may have been given a prescription for an opioid (narcotic) pain medicine and/or have received a pain medicine while here in the Emergency Department. These medicines can make you drowsy or impaired. You must not drive, operate dangerous equipment, or engage in any other dangerous activities while taking these medications. If you drive while taking these medications, you could be arrested for DUI, or driving under the influence. Do not drink any alcohol while you are taking these medications.     Opioid pain medications can cause addiction. If you have a history of chemical dependency of any type, you are at a higher risk of becoming addicted to pain medications.  Only take these prescribed medications to treat your pain when all other options have been tried. Take it for as short a time and as few doses as possible. Store your pain pills in a secure place, as they are frequently stolen and provide a dangerous opportunity for children or visitors in your house to start abusing these powerful medications. We will not replace any lost or stolen medicine.  As soon as your pain is better, you should flush all your remaining medication.     Many prescription pain medications contain Tylenol  (acetaminophen),  including Vicodin , Tylenol #3 , Norco , Lortab , and Percocet .  You should not take any extra pills of Tylenol  if you are using these prescription medications or you can get very sick.  Do not ever take more than 3000 mg of acetaminophen in any 24 hour period.    All opioids tend to cause constipation. Drink plenty of water and eat foods that have a lot of fiber, such as fruits, vegetables, prune juice, apple juice and high fiber cereal.  Take a laxative if you don t move your bowels at least every other day. Miralax , Milk of Magnesia, Colace , or Senna  can be used to keep you regular.      Remember that you can always come back to the Emergency Department if you are not able to see your regular doctor in the amount of time listed above, if you get any new symptoms, or if there is anything that worries you.            Future Appointments        Provider Department Dept Phone Center    3/28/2017 4:30 PM Giuseppe Mullins MD [facility] [phone] [facility]      24 Hour Appointment Hotline       To make an appointment at any [facility], call [phone] ([phone]). If you don't have a family doctor or clinic, we will help you find one. [place] clinics are conveniently located to serve the needs of you and your family.             Review of your medicines      Our records show that you are taking the medicines listed below. If these are incorrect, please call your family doctor or clinic.        Dose / Directions Last dose taken    ACCU-CHEK JUANY PLUS test strip   Generic drug:  blood glucose monitoring        Refills:  0        AMBIEN PO   Dose:  10 mg        Take 10 mg by mouth   Refills:  0        ascorbic acid 500 MG Cpcr CR capsule   Commonly known as:  vitamin C   Dose:  500 mg        Take 500 mg by mouth daily   Refills:  0        ASPIRIN PO   Dose:  81 mg        Take 81 mg by mouth daily   Refills:  0        donepezil 5 MG tablet   Commonly known as:  ARICEPT   Dose:  5 mg   Quantity:  30  tablet        Take 1 tablet (5 mg) by mouth At Bedtime   Refills:  3        escitalopram 10 MG tablet   Commonly known as:  LEXAPRO        Refills:  0        * estradiol 0.05 MG/24HR BIW patch   Commonly known as:  VIVELLE-DOT   Dose:  1 patch        Place 1 patch onto the skin twice a week   Refills:  0        * estradiol 0.0375 MG/24HR BIW patch   Commonly known as:  VIVELLE-DOT        Refills:  0        GEMFIBROZIL PO   Dose:  600 mg        Take 600 mg by mouth daily   Refills:  0        GLIPIZIDE PO   Dose:  10 mg        Take 10 mg by mouth   Refills:  0        JANUVIA PO   Dose:  100 mg        Take 100 mg by mouth   Refills:  0        LIVALO PO   Dose:  4 mg        Take 4 mg by mouth daily   Refills:  0        LOSARTAN POTASSIUM PO   Dose:  50 mg        Take 50 mg by mouth   Refills:  0        METOPROLOL SUCCINATE ER PO   Dose:  50 mg        Take 50 mg by mouth daily   Refills:  0        OVER-THE-COUNTER        allertec ( antihystamine) 10 mg daily   Refills:  0        oxyCODONE-acetaminophen 5-325 MG per tablet   Commonly known as:  PERCOCET        Take by mouth every 4 hours as needed for moderate to severe pain   Refills:  0        PROGESTERONE MICRONIZED PO   Dose:  200 mg        Take 200 mg by mouth 10 days a month   Refills:  0        traZODone 50 MG tablet   Commonly known as:  DESYREL   Dose:  50 mg        Take 50 mg by mouth At Bedtime   Refills:  0        VITAMIN D (CHOLECALCIFEROL) PO   Dose:  27994 Units        Take 50,000 Units by mouth once a week   Refills:  0        * Notice:  This list has 2 medication(s) that are the same as other medications prescribed for you. Read the directions carefully, and ask your doctor or other care provider to review them with you.            Procedures and tests performed during your visit     Basic metabolic panel    CBC with platelets differential    CT Head w/o Contrast    EKG 12-lead, tracing only    Troponin I      Orders Needing Specimen Collection     None       Pending Results     No orders found from 3/24/2017 to 3/27/2017.            Pending Culture Results     No orders found from 3/24/2017 to 3/27/2017.             Test Results from your hospital stay     3/26/2017  8:12 AM - Interface, Flexilab Results      Component Results     Component Value Ref Range & Units Status    WBC 6.0 4.0 - 11.0 10e9/L Final    RBC Count 4.35 3.8 - 5.2 10e12/L Final    Hemoglobin 13.9 11.7 - 15.7 g/dL Final    Hematocrit 39.9 35.0 - 47.0 % Final    MCV 92 78 - 100 fl Final    MCH 32.0 26.5 - 33.0 pg Final    MCHC 34.8 31.5 - 36.5 g/dL Final    RDW 12.0 10.0 - 15.0 % Final    Platelet Count 235 150 - 450 10e9/L Final    Diff Method Automated Method  Final    % Neutrophils 78.2 % Final    % Lymphocytes 13.2 % Final    % Monocytes 6.3 % Final    % Eosinophils 1.8 % Final    % Basophils 0.3 % Final    % Immature Granulocytes 0.2 % Final    Nucleated RBCs 0 0 /100 Final    Absolute Neutrophil 4.7 1.6 - 8.3 10e9/L Final    Absolute Lymphocytes 0.8 0.8 - 5.3 10e9/L Final    Absolute Monocytes 0.4 0.0 - 1.3 10e9/L Final    Absolute Eosinophils 0.1 0.0 - 0.7 10e9/L Final    Absolute Basophils 0.0 0.0 - 0.2 10e9/L Final    Abs Immature Granulocytes 0.0 0 - 0.4 10e9/L Final    Absolute Nucleated RBC 0.0  Final         3/26/2017  8:26 AM - Interface, Flexilab Results      Component Results     Component Value Ref Range & Units Status    Sodium 137 133 - 144 mmol/L Final    Potassium 4.3 3.4 - 5.3 mmol/L Final    Chloride 104 94 - 109 mmol/L Final    Carbon Dioxide 26 20 - 32 mmol/L Final    Anion Gap 7 3 - 14 mmol/L Final    Glucose 162 (H) 70 - 99 mg/dL Final    Urea Nitrogen 13 7 - 30 mg/dL Final    Creatinine 0.70 0.52 - 1.04 mg/dL Final    GFR Estimate 84 >60 mL/min/1.7m2 Final    Non  GFR Calc    GFR Estimate If Black >90   GFR Calc   >60 mL/min/1.7m2 Final    Calcium 8.5 8.5 - 10.1 mg/dL Final         3/26/2017  8:31 AM - Interface, Flexilab Results       Component Results     Component Value Ref Range & Units Status    Troponin I ES  0.000 - 0.045 ug/L Final    <0.015  The 99th percentile for upper reference range is 0.045 ug/L.  Troponin values in   the range of 0.045 - 0.120 ug/L may be associated with risks of adverse   clinical events.           3/26/2017  8:49 AM - Interface, Radiant Ib      Narrative     CT SCAN OF THE HEAD WITHOUT CONTRAST March 26, 2017 8:17 AM     HISTORY: Fell, head pain, history of dementia.    TECHNIQUE: Axial images of the head and coronal reformations without  IV contrast material. Radiation dose for this scan was reduced using  automated exposure control, adjustment of the mA and/or kV according  to patient size, or iterative reconstruction technique.    COMPARISON: None.    FINDINGS: There is some moderate cerebral atrophy. Patchy white matter  changes are seen in both hemispheres without mass effect. There is no  evidence for intradural hemorrhage, mass effect, acute infarct, or  skull fracture. Vascular calcifications are noted.        Impression     IMPRESSION:  1. Moderate cerebral atrophy.  2. Scattered nonspecific white matter changes which are most likely  due to chronic small vessel ischemic disease given the patient's age.  3. No evidence for intracranial hemorrhage or any acute process.    NEHEMIAH COLLADO MD                Clinical Quality Measure: Blood Pressure Screening     Your blood pressure was checked while you were in the emergency department today. The last reading we obtained was  BP: 156/88 . Please read the guidelines below about what these numbers mean and what you should do about them.  If your systolic blood pressure (the top number) is less than 120 and your diastolic blood pressure (the bottom number) is less than 80, then your blood pressure is normal. There is nothing more that you need to do about it.  If your systolic blood pressure (the top number) is 120-139 or your diastolic blood pressure (the bottom  number) is 80-89, your blood pressure may be higher than it should be. You should have your blood pressure rechecked within a year by a primary care provider.  If your systolic blood pressure (the top number) is 140 or greater or your diastolic blood pressure (the bottom number) is 90 or greater, you may have high blood pressure. High blood pressure is treatable, but if left untreated over time it can put you at risk for heart attack, stroke, or kidney failure. You should have your blood pressure rechecked by a primary care provider within the next 4 weeks.  If your provider in the emergency department today gave you specific instructions to follow-up with your doctor or provider even sooner than that, you should follow that instruction and not wait for up to 4 weeks for your follow-up visit.        Thank you for choosing [facility]       Thank you for choosing [facility] for your care. Our goal is always to provide you with excellent care. Hearing back from our patients is one way we can continue to improve our services. Please take a few minutes to complete the written survey that you may receive in the mail after you visit with us. Thank you!        [employer] Information     [employer] gives you secure access to your electronic health record. If you see a primary care provider, you can also send messages to your care team and make appointments. If you have questions, please call your primary care clinic.  If you do not have a primary care provider, please call [phone] and they will assist you.        Care EveryWhere ID     This is your Care EveryWhere ID. This could be used by other organizations to access your [facility] medical records  [id number]        After Visit Summary       This is your record. Keep this with you and show to your community pharmacist(s) and doctor(s) at your next visit.

## 2017-03-26 NOTE — ED PROVIDER NOTES
History     Chief Complaint:  Fall      HPI   History is limited due to the patient's dementia.   Elise Bernard is a 66 year old female with a past medical history of early onset dementia, hypertension, hyperlipidemia and diabetes who presents with  from home via EMS for evaluation of fall.  reports the patient had a fall today and he was able to help her up. He went to go change and heard the patient fall and so  called 911.  states the patient was complaining of dizziness prior to her falls and told him that she fell on her knees both times. did not witness falls. Patient does not recall falls. He reports that she was on her way to the bathroom and felt better after having a bowel movement prior to paramedics getting there.  The patient had a fall in January, but this was more of her tripping on bags on the ground as per . Here in the ED, the patient is complaining of mild headache, but denies feeling dizzy, chest pain or shortness of breath. The patient has a caretaker who comes in daily and has been diagnosed with dementia three years ago.     Allergies:  Vicodin      Medications:    Ambien  Vitamin C  Losartan  Glipizide  Januvia  Percocet  Trazodone  Estradiol  Aricept  Aspirin  Vitmain D  Metoprolol  Livalo  Gemfibrozil  Progesterone      Past Medical History:    Dementia  Diabetes  Hyperlipidemia  Hypertension     Past Surgical History:    Breast reduction  Adenoidectomy  Colonoscopy  Tonsillectomy  Tympanoplasty     Family History:    Father: Heart disease     Social History:  Marital Status:     Smoking status: Never smoker  Alcohol use: One-two beers/night    The patient presents with .   The patient has no children. Chart notes there is previous conflict with daughter.     Review of Systems   Constitutional: Positive for diaphoresis.   Respiratory: Negative for shortness of breath.    Cardiovascular: Negative for chest pain.   Neurological:  "Positive for dizziness and headaches.   All other systems reviewed and are negative.    Physical Exam   First Vitals:  BP: 126/72  Heart Rate: 72  Temp: 97.5  F (36.4  C)  Resp: 18  Height: 162.6 cm (5' 4\")  Weight: 54.4 kg (120 lb)  SpO2: 98 %    Physical Exam  Constitutional:  Knows self and year, but confabulates on other questions.      Appears well-developed and well-nourished.   HENT:   Head:    Normocephalic and atraumatic. No areas of scalp tenderness.   Right Ear:   Tympanic membrane and external ear normal.   Left Ear:   Tympanic membrane and external ear normal.   Mouth/Throat:   Oropharynx is clear and moist and mucous membranes are normal.   Eyes:    Conjunctivae normal and EOM are normal.      Pupils are equal, round, and reactive to light.   Neck:    Normal range of motion. Neck supple. No neck tenderness.   Cardiovascular:  Normal rate, S1 normal, S2 normal and normal heart sounds.      No gallop. No murmur heard.  Pulmonary/Chest:  Effort normal and breath sounds normal.      No wheezes. No rhonchi. No rales.   Abdominal:   Soft. Normal appearance. No hepatosplenomegaly.      No tenderness. No rigidity, no rebound, no guarding.      No CVA tenderness.   Musculoskeletal:  Normal range of motion. No back tenderness. Small areas of bruising both anterior knees, no bony tenderness. Able to move without difficulty.   Neurological:   Alert and oriented to person and year.      Normal strength and normal reflexes.      No cranial nerve deficit or sensory deficit.      GCS eye subscore is 4. GCS verbal subscore is 5.      GCS motor subscore is 6. Finger to nose intact bilaterally.    Emergency Department Course   ECG @ 0753  Indication: Dizziness   Rate 79 bpm.   SC interval 108 ms.   QRS duration 80 ms.   QT/QTc 414/474 ms.   P-R-T axes 17.  Notes: Sinus rhythm with short SC.    Imaging:  Radiographic findings were communicated with the patient who voiced understanding of the findings.    CT Head w/o " Contrast  Final Result  IMPRESSION:  1. Moderate cerebral atrophy.  2. Scattered nonspecific white matter changes which are most likely  due to chronic small vessel ischemic disease given the patient's age.  3. No evidence for intracranial hemorrhage or any acute process.    NEHEMIAH COLLADO MD    Laboratory:  CBC: WBC 6.0 (WNL) HGB 13.9 (WNL)  (WNL)   BMP: Cr 0.70 (WNL) Glucose 162 (H)  Rest WNL  0800: Troponin I: <0.015 (WNL)  Blood alcohol: <0.01 (WNL)      ED Course:  The patient arrived by ambulance. She was escorted to the ED where her vitals were measured and recorded.   Nursing notes and past medical history reviewed.   I performed a physical examination of the patient as documented above.  I explained the plan with the patient and  who consents to this.   The above workups were undertaken.   0853: The patient and  were updated.  0909: The patient was reevaluated.  reports the patient would look for bee and he is not sure how much she is drinking.  I personally reviewed the laboratory and imaging results with the Patient and  and answered all related questions prior to discharge.   Findings and plan explained to the Patient and spouse. Patient discharged home with instructions regarding supportive care, medications, and reasons to return. The importance of close follow-up was reviewed.     Impression & Plan      Medical Decision Making:  Elise Bernard is a 66 year old female with dementia cared for at home. She fell twice not witnessed by  and felt weak getting up. She did complain of feeling dizzy to him before she fell, she doesn't recall that now. She tells me she has a mild headache and complains of bilateral knee pain. She does have some bruising in both knees, but no bony tenderness with full range of motion. Evaluation here was unremarkable.  has noted to me that she has 1-2 beer per day, but she was asking for beers here. We had further discussion and   said that sometimes he is concerned that she goes in to get more beers and he is unsure if she is overusing. I then added on a blood alcohol. I discussed with him we could admit her for evaluation of fall of unclear cause while awaiting blood alcohol, however, he says that even if the blood alcohol was positive or negative , he would prefer to take her home which I think is reasonable given her medical situation. We discussed either getting rid of or hiding the alcohol. It's unclear if she tripped or had other cause of fall such as arrhythmia as she did feel somewhat dizzy before. She has been in normal sinus rhythm here.She may have been vagal as she felt better after bowel movement.     Diagnosis:    ICD-10-CM    1. Fall, initial encounter W19.XXXA    2. Contusion of knee, unspecified laterality, initial encounter S80.00XA    3. Memory loss     4. Questionable alcohol consumption            Disposition:   Discharge to home with primary care follow up. Call  with alcohol results.     Addendum: I have called  with negative ETOH results. Advised they can return at any time if further cuoncerns.   I, Bella Elizabeth, am serving as a scribe on 3/26/2017 at 7:29 AM to personally document services performed by Laney Dhillon MD, based on my observations and the provider's statements to me.         Laney Dhillon MD  03/26/17 2043

## 2017-03-26 NOTE — DISCHARGE INSTRUCTIONS
I will call you with the alcohol level.  Hide or get rid of the alcohol.     Uncertain Causes of Fall  You have had a fall today. But the cause of your fall is not certain. Falls can occur due to slipping, tripping or losing your balance. A fall can also occur from a fainting spell or seizure.  While a fall can happen for a simple reason (tripping over something), falls in elderly people are often caused by a combination of things:    Age-related decline in function with worsening balance, stability, vision, and muscle strength    Chronic illness such as heart arrhythmias, heart valve disease, vascular disease, COPD, diabetes, strokes, arthritis    Effects or side effects of medicines    Dehydration.    Environmental hazards such as uneven or slippery ground, unfamiliar place, obstacles, uneven surfaces, or slippery ground    Situational factors (related to the activity being done, e.g., rushing to the bathroom)  Because the cause of your fall today is not certain, it is possible that a fainting spell or seizure was the cause. This means that it could happen again, without warning. If you fall again, without a cause, then you should return to this facility promptly to have further tests. Otherwise, follow up with your doctor as explained below.  It is normal to feel sore and tight in your muscles and back the next day, and not just the muscles you initially injured. Remember, all the parts of your body are connected, so while initially one area hurts, the next day another may hurt. Also, when you injure yourself, it causes inflammation, which then causes the muscles to tighten up and hurt more. After the initial worsening, it should gradually improve over the next few days. However, more severe pain should be reported.  Even without a definite head injury, you can still get a concussion. Concussions and even bleeding can still occur, especially if you have had a recent injury or take blood thinner medicine. It is  not unusual to have a mild headache and feel tired and even nauseous or dizzy.  Home care    Rest today and resume your normal activities as soon as you are feeling back to normal. It is best to remain with someone who can check on you for the next 24 hours to watch for another episode of falling.    If you were injured during the fall, follow the advice from your doctor regarding care of your injury.     If you become light-headed or dizzy, lie down immediately or sit and lean forward with your head down.    As a precaution, do not drive a car or operate dangerous equipment, do not take a bath alone (use a shower instead) and do not swim alone until you see your doctor. A condition causing fainting or seizures must be ruled out before resuming these activities.    You may use acetaminophen or ibuprofen to control pain, unless another pain medicine was prescribed. If you have chronic liver or kidney disease or ever had a stomach ulcer or gastrointestinal bleeding, talk with your doctor before using these medicines.    Keep your appointments for any further testing that may have been scheduled for you.  Follow-up care  Follow up with your healthcare provider, or as advised.  If X-rays or CT scan were done, you will be notified if there is a change in the reading, especially if it affects treatment.  Call 911  Call 911 if any of these occur:    Trouble breathing    Confused or difficulty arousing    Fainting or loss of consciousness    Rapid or very slow heart rate    Seizure    Difficulty with speech or vision, weakness of an arm or leg    Difficulty walking or talking, loss of balance, numbness or weakness in one side of your body, facial droop  When to seek medical advice  Call your healthcare provider right away if any of these occur:    Another unexplained fall    Dizziness    Severe headache    Nausea and vomiting    Blood in vomit, stools (black or red color)    5371-7227 [email or web address]. [address] [place] [address]  [place] [address]. All rights reserved. This information is not intended as a substitute for professional medical care. Always follow your healthcare professional's instructions.          Bruises (Contusions)    A contusion is a bruise. A bruise happens when a blow to your body doesn't break the skin but does break blood vessels beneath the skin. Blood leaking from the broken vessels causes redness and swelling. As it heals, your bruise is likely to turn colors like purple, green, and yellow. This is normal. The bruise should fade in 2 or 3 weeks.  Factors that make you more likely to bruise  Almost everyone bruises now and then. Certain people do bruise more easily than others. You're more prone to bruising as you get older. That's because blood vessels become more fragile with age. You're also more likely to bruise if you have a clotting disorder such as hemophilia or take medications that reduce clotting, including aspirin.  When to go to the emergency room (ER)  Bruises almost always heal on their own without special treatment. But for some people, a bad bruise can be serious. Seek medical care if you:    Have a clotting disorder such as hemophilia.    Have cirrhosis or other serious liver disease.    Take blood-thinning medications such as warfarin (Coumadin).  What to expect in the ER  A doctor will examine your bruise and ask about any health conditions you have. In some cases, you may have a test to check how well your blood clots. Other treatment will depend on your needs.  Follow-up care  Sometimes a bruise gets worse instead of better. It may become larger and more swollen. This can occur when your body walls off a small pool of blood under the skin (hematoma). In very rare cases, your doctor may need to drain excess blood from the area.  Tip:  Apply an ice pack or bag of frozen peas to a bruise (keep a thin cloth between the cold source and your skin). This can help reduce redness and  swelling.     [id number] [employer]. [address] [place] [address], [place] [address]. All rights reserved. This information is not intended as a substitute for professional medical care. Always follow your healthcare professional's instructions.      Discharge Instructions  Discharge Instructions  Head Injury  f you are taking a blood thinner, such as aspirin, Pradaxa  (dabigatran), Coumadin  (warfarin), or Plavix  (clopidogrel), you are at especially high risk for immediate or delayed bleeding, and need to re-check with a physician in 24 hours  You have been seen today for a head injury. You were checked for serious problems, like bleeding on the brain, but these problems cannot always be found right away.  Due to this risk, you should not be alone for 24 hours after your injury.  Follow up with your regular physician as needed. I, or sooner if any of the symptoms below happen.     Return to the Emergency Department if:    You are confused, have amnesia, or you are not acting right.    Your headache gets worse or you start to have a really bad headache even with your recommended treatment plan.    You vomit more than once.    You have a convulsion or seizure.    You have trouble walking.    You have weakness or paralysis in an arm or a leg.    You have blood or fluid coming from your ears or nose.    You have new symptoms or anything that worries you.    Sleeping:  It is okay for you to sleep, but someone should wake you up as instructed by your doctor, and someone should check on you at your usual time to wake up.     Activity:    Do not drive for at least 24 hours.    Do not drive if you have dizzy spells or trouble concentrating, or remembering things.    Do not return to any contact sports until cleared by your regular doctor.     Follow-up:  It is very important that you make an appointment with your clinic and go to the appointment.  If you do not follow-up with your regular doctor, it may result in  missing an important development which could result in permanent injury or disability and/or lasting pain.  If there is any problem keeping your appointment, call your doctor or return to the Emergency Department.    MORE INFORMATION:    Concussion:  A concussion is a minor head injury that may cause temporary problems with the way your brain works.  Some symptoms include:  confusion, amnesia, nausea and vomiting, dizziness, fatigue, memory or concentration problems, irritability and sleep problems.    CT Scans: Your evaluation today may have included a CT scan (CAT scan) to look for things like bleeding or a skull fracture (break).  CT scans involve radiation and too many CT scans can cause serious health problems like cancer, especially in children.  Because of this, your doctor may not have ordered a CT scan today if they think you are at low risk for a serious or life threatening problem.    If you were given a prescription for medicine here today, be sure to read all of the information (including the package insert) that comes with your prescription.  This will include important information about the medicine, its side effects, and any warnings that you need to know about.  The pharmacist who fills the prescription can provide more information and answer questions you may have about the medicine.  If you have questions or concerns that the pharmacist cannot address, please call or return to the Emergency Department.     Opioid Medication Information    Pain medications are among the most commonly prescribed medicines, so we are including this information for all our patients. If you did not receive pain medication or get a prescription for pain medicine, you can ignore it.     You may have been given a prescription for an opioid (narcotic) pain medicine and/or have received a pain medicine while here in the Emergency Department. These medicines can make you drowsy or impaired. You must not drive, operate  dangerous equipment, or engage in any other dangerous activities while taking these medications. If you drive while taking these medications, you could be arrested for DUI, or driving under the influence. Do not drink any alcohol while you are taking these medications.     Opioid pain medications can cause addiction. If you have a history of chemical dependency of any type, you are at a higher risk of becoming addicted to pain medications.  Only take these prescribed medications to treat your pain when all other options have been tried. Take it for as short a time and as few doses as possible. Store your pain pills in a secure place, as they are frequently stolen and provide a dangerous opportunity for children or visitors in your house to start abusing these powerful medications. We will not replace any lost or stolen medicine.  As soon as your pain is better, you should flush all your remaining medication.     Many prescription pain medications contain Tylenol  (acetaminophen), including Vicodin , Tylenol #3 , Norco , Lortab , and Percocet .  You should not take any extra pills of Tylenol  if you are using these prescription medications or you can get very sick.  Do not ever take more than 3000 mg of acetaminophen in any 24 hour period.    All opioids tend to cause constipation. Drink plenty of water and eat foods that have a lot of fiber, such as fruits, vegetables, prune juice, apple juice and high fiber cereal.  Take a laxative if you don t move your bowels at least every other day. Miralax , Milk of Magnesia, Colace , or Senna  can be used to keep you regular.      Remember that you can always come back to the Emergency Department if you are not able to see your regular doctor in the amount of time listed above, if you get any new symptoms, or if there is anything that worries you.

## 2017-03-28 ENCOUNTER — CARE COORDINATION (OUTPATIENT)
Dept: CASE MANAGEMENT | Facility: CLINIC | Age: 67
End: 2017-03-28

## 2017-03-28 ENCOUNTER — OFFICE VISIT (OUTPATIENT)
Dept: NEUROLOGY | Facility: CLINIC | Age: 67
End: 2017-03-28

## 2017-03-28 VITALS
SYSTOLIC BLOOD PRESSURE: 147 MMHG | WEIGHT: 130.6 LBS | DIASTOLIC BLOOD PRESSURE: 86 MMHG | HEIGHT: 64 IN | HEART RATE: 101 BPM | BODY MASS INDEX: 22.3 KG/M2

## 2017-03-28 DIAGNOSIS — F02.80 FRONTO-TEMPORAL DEMENTIA (H): Primary | ICD-10-CM

## 2017-03-28 DIAGNOSIS — G31.09 FRONTO-TEMPORAL DEMENTIA (H): Primary | ICD-10-CM

## 2017-03-28 NOTE — LETTER
3/28/2017     RE: Elise Bernard  [address] [place] [address]     Dear Colleague,    Thank you for referring your patient, Elise Bernard, to the [facility] at [facility]. Please see a copy of my visit note below.    Mrs. Bernard returned today with her  so that we might discuss my recommendations for medication that might reduce her impulsive behaviors and also to follow up on her promise that she would stop wearing contact lenses (see last note).  She has indeed stopped wearing contacts and therefore no longer runs the risk of using the lens cleaning fluid as a substitute for eye drops.    Two days ago she was brought to the emergency room for evaluation following a collapse at home.  Her  heard a loud sound in the kitchen and when he got there he found his wife on her back on the floor and sweating profusely. She was unable to explain how she fell but appeared otherwise well.  He helped her up from the floor and went to the bedroom to dress when he heard another loud noise and on this occasion found his wife again on the floor in a different part of the kitchen. He helped her to her feet a second time and she told him that she needed to have a bowel motion after which she seemed better.  A comprehensive evaluation in the emergency room was normal.  The cause of the two falls is not clear at this stage.    Since the patient was last seen in clinic, her general behavior has not changed. Her  has now made arrangements for her to be in extended adult day care four days a week and he plans to use vacation/family leave time one day a week so that he can supervise her.  She continues to drink beer on a daily basis and he is concerned that the alcohol makes her behavior even more difficult to manage.  During this visit Mrs. Bernard have an undertaking that she would no longer drink beer.  Mrs. Bernard intermittently insults strangers in public,  behavior that her  finds upsetting.    Today on observation and exam, all her speech was delivered in a singing voice with frequent echolalia and perseveration.  However, she was able to repeat a short sentence in a normal speaking voice.  There were no myoclonic jerks and her motor exam was entirely normal with no signs of parkinsonism.    Mrs. Bernard has been taking donepezil for several months with any obvious benefit, so I recommend that it be discontinued. Though cholinesterase inhibitors are frequently used in patients with FTD there is not good evidence that they are of any benefit. In fact, no single class of psychoactive medication has been shown unequivocally to modify the impulsiveness that is common in FTD patients.  Nevertheless, I recommended to the patient and her  that she take lamotrigine as a mood stabilizer in the hope that it will have a beneficial effect on her more challenging behaviors; she will begin on 25 mg /day and increase slowly over 5-6 weeks to a target dose of 200 mg/day.  I advised her  of the potential side effects particularly rash and its significance.  Mr. Bernard will let me know if there are any problems with the medication and report on whether he notices any improvement in his wife's behavior within the next 6-8 weeks.  A brain MRI that I ordered at the last visit is still listed as pending but I will arrange that it be done.    Again, thank you for allowing me to participate in the care of your patient.      Sincerely,    Giuseppe Mullins MD

## 2017-03-28 NOTE — MR AVS SNAPSHOT
"              After Visit Summary   3/28/2017    Elise Bernard    MRN: [id number]           Patient Information     Date Of Birth          1950        Visit Information        Provider Department      3/28/2017 4:30 PM Giuseppe Mullins MD [facility]         Follow-ups after your visit        Who to contact     Please call your clinic at [phone] to:    Ask questions about your health    Make or cancel appointments    Discuss your medicines    Learn about your test results    Speak to your doctor   If you have compliments or concerns about an experience at your clinic, or if you wish to file a complaint, please contact [facility] Patient Relations at [phone] or email us at [email or web address]         Additional Information About Your Visit        MyChart Information     [employer] gives you secure access to your electronic health record. If you see a primary care provider, you can also send messages to your care team and make appointments. If you have questions, please call your primary care clinic.  If you do not have a primary care provider, please call [phone] and they will assist you.      [employer] is an electronic gateway that provides easy, online access to your medical records. With [employer], you can request a clinic appointment, read your test results, renew a prescription or communicate with your care team.     To access your existing account, please contact your [facility] or call [phone] for assistance.        Care EveryWhere ID     This is your Care EveryWhere ID. This could be used by other organizations to access your [facility] medical records  [id number]        Your Vitals Were     Pulse Height Breastfeeding? BMI (Body Mass Index)          101 5' 4\" (162.6 cm) No 22.42 kg/m2         Blood Pressure from Last 3 Encounters:   03/28/17 147/86   03/26/17 (!) 153/92   02/28/17 147/79    Weight from Last 3 " Encounters:   03/28/17 130 lb 9.6 oz (59.2 kg)   03/26/17 120 lb (54.4 kg)   02/28/17 138 lb 9.6 oz (62.9 kg)              Today, you had the following     No orders found for display       Primary Care Provider Office Phone # Fax #    Myles Birmingham [phone]       [facility] [address]  [place] [address]        Thank you!     Thank you for choosing [facility]  for your care. Our goal is always to provide you with excellent care. Hearing back from our patients is one way we can continue to improve our services. Please take a few minutes to complete the written survey that you may receive in the mail after your visit with us. Thank you!             Your Updated Medication List - Protect others around you: Learn how to safely use, store and throw away your medicines at www.disposemymeds.org.          This list is accurate as of: 3/28/17 11:59 PM.  Always use your most recent med list.                   Brand Name Dispense Instructions for use    ACCU-CHEK JUANY PLUS test strip   Generic drug:  blood glucose monitoring          AMBIEN PO      Take 10 mg by mouth       ascorbic acid 500 MG Cpcr CR capsule    vitamin C     Take 500 mg by mouth daily       ASPIRIN PO      Take 81 mg by mouth daily       donepezil 5 MG tablet    ARICEPT    30 tablet    Take 1 tablet (5 mg) by mouth At Bedtime       escitalopram 10 MG tablet    LEXAPRO         * estradiol 0.05 MG/24HR BIW patch    VIVELLE-DOT     Place 1 patch onto the skin twice a week       * estradiol 0.0375 MG/24HR BIW patch    VIVELLE-DOT         GEMFIBROZIL PO      Take 600 mg by mouth daily       GLIPIZIDE PO      Take 10 mg by mouth       JANUVIA PO      Take 100 mg by mouth       LIVALO PO      Take 4 mg by mouth daily       LOSARTAN POTASSIUM PO      Take 50 mg by mouth       METOPROLOL SUCCINATE ER PO      Take 50 mg by mouth daily       OVER-THE-COUNTER      allertec ( antihystamine) 10 mg daily        oxyCODONE-acetaminophen 5-325 MG per tablet    PERCOCET     Take by mouth every 4 hours as needed for moderate to severe pain       PROGESTERONE MICRONIZED PO      Take 200 mg by mouth 10 days a month       traZODone 50 MG tablet    DESYREL     Take 50 mg by mouth At Bedtime       VITAMIN D (CHOLECALCIFEROL) PO      Take 50,000 Units by mouth once a week       * Notice:  This list has 2 medication(s) that are the same as other medications prescribed for you. Read the directions carefully, and ask your doctor or other care provider to review them with you.

## 2017-03-28 NOTE — PROGRESS NOTES
Clinic Care Coordination Contact  OUTREACH    Referral Information:  Referral Source: ED Follow-Up       Universal Utilization:   ED Visits in last year: 1  Hospital visits in last year: 0  Last PCP appointment: 02/09/17  Concerns: Spouse may benefit from resources or support.        Clinical Concerns:  Current Medical Concerns: Fall at home  Current Behavioral Concerns: Dementia     Functional Status:  Mobility Status: Independent  Transportation: Spouse     Psychosocial:  Current living arrangement:: I live in a private home with spouse  Financial/Insurance: [employer]    Advanced Care Plans on file: Yes    Caregiver Understanding: Spoke briefly with spouse who reports patient is doing well. He does not have any questions or concerns at this time. Encouraged spouse to contact RN CC or clinic care team with questions or concerns.        Plan: ***

## 2017-03-29 ENCOUNTER — TELEPHONE (OUTPATIENT)
Dept: NEUROLOGY | Facility: CLINIC | Age: 67
End: 2017-03-29

## 2017-03-29 RX ORDER — LAMOTRIGINE 25 MG/1
25 TABLET ORAL DAILY
Qty: 14 TABLET | Refills: 0 | Status: SHIPPED | OUTPATIENT
Start: 2017-03-29 | End: 2017-04-12

## 2017-03-29 RX ORDER — LAMOTRIGINE 200 MG/1
200 TABLET ORAL DAILY
Qty: 30 TABLET | Refills: 3 | Status: SHIPPED | OUTPATIENT
Start: 2017-05-05 | End: 2017-06-04

## 2017-03-29 RX ORDER — LAMOTRIGINE 100 MG/1
100 TABLET ORAL DAILY
Qty: 7 TABLET | Refills: 0 | Status: SHIPPED | OUTPATIENT
Start: 2017-04-27 | End: 2017-05-04

## 2017-03-29 RX ORDER — LAMOTRIGINE 25 MG/1
50 TABLET ORAL DAILY
Qty: 28 TABLET | Refills: 0 | Status: SHIPPED | OUTPATIENT
Start: 2017-04-13 | End: 2017-04-27

## 2017-03-29 NOTE — PROGRESS NOTES
Mrs. Bernard returned today with her  so that we might discuss my recommendations for medication that might reduce her impulsive behaviors and also to follow up on her promise that she would stop wearing contact lenses (see last note).  She has indeed stopped wearing contacts and therefore no longer runs the risk of using the lens cleaning fluid as a substitute for eye drops.    Two days ago she was brought to the emergency room for evaluation following a collapse at home.  Her  heard a loud sound in the kitchen and when he got there he found his wife on her back on the floor and sweating profusely. She was unable to explain how she fell but appeared otherwise well.  He helped her up from the floor and went to the bedroom to dress when he heard another loud noise and on this occasion found his wife again on the floor in a different part of the kitchen. He helped her to her feet a second time and she told him that she needed to have a bowel motion after which she seemed better.  A comprehensive evaluation in the emergency room was normal.  The cause of the two falls is not clear at this stage.    Since the patient was last seen in clinic, her general behavior has not changed. Her  has now made arrangements for her to be in extended adult day care four days a week and he plans to use vacation/family leave time one day a week so that he can supervise her.  She continues to drink beer on a daily basis and he is concerned that the alcohol makes her behavior even more difficult to manage.  During this visit Mrs. Bernard have an undertaking that she would no longer drink beer.  Mrs. Bernard intermittently insults strangers in public, behavior that her  finds upsetting.    Today on observation and exam, all her speech was delivered in a singing voice with frequent echolalia and perseveration.  However, she was able to repeat a short sentence in a normal speaking voice.  There were no myoclonic jerks  and her motor exam was entirely normal with no signs of parkinsonism.    Mrs. Bernard has been taking donepezil for several months with any obvious benefit, so I recommend that it be discontinued. Though cholinesterase inhibitors are frequently used in patients with FTD there is not good evidence that they are of any benefit. In fact, no single class of psychoactive medication has been shown unequivocally to modify the impulsiveness that is common in FTD patients.  Nevertheless, I recommended to the patient and her  that she take lamotrigine as a mood stabilizer in the hope that it will have a beneficial effect on her more challenging behaviors; she will begin on 25 mg /day and increase slowly over 5-6 weeks to a target dose of 200 mg/day.  I advised her  of the potential side effects particularly rash and its significance.  Mr. Bernard will let me know if there are any problems with the medication and report on whether he notices any improvement in his wife's behavior within the next 6-8 weeks.  A brain MRI that I ordered at the last visit is still listed as pending but I will arrange that it be done.

## 2017-03-29 NOTE — TELEPHONE ENCOUNTER
Spouse is waiting for RX to be sent to pharmacy.  Discussed with Dr. Mullins who will contact Johnie (spouse) this afternoon to discuss medication options.  Notified spouse of the above - no further action needed.

## 2017-04-03 ENCOUNTER — TELEPHONE (OUTPATIENT)
Dept: NEUROLOGY | Facility: CLINIC | Age: 67
End: 2017-04-03

## 2017-04-03 NOTE — TELEPHONE ENCOUNTER
"Caller: Johnie   Relationship to Patient:    Call Back Number: [phone]   Reason for Call: Dr Mullins put this patient on a medication and it is not working well for her. He would like a call back to discuss the matter. Thanks.   Nichole Freire [facility]    Patient's spouse Johnie calls reporting \"the medication prescribed is not working\".  Writer asked Johnie if he was referring to Lamotrigine and he said \"I don't know, whatever the most recent medication prescribed was\".  Johnie raised his voice/seemed very upset throughout the brief conversation.  Routed to Dr. Mullins as FYI  "

## 2017-05-11 ENCOUNTER — HOSPITAL ENCOUNTER (INPATIENT)
Facility: CLINIC | Age: 67
LOS: 1 days | Discharge: HOME | DRG: 923 | End: 2017-05-12
Attending: EMERGENCY MEDICINE | Admitting: INTERNAL MEDICINE
Payer: COMMERCIAL

## 2017-05-11 ENCOUNTER — APPOINTMENT (OUTPATIENT)
Dept: GENERAL RADIOLOGY | Facility: CLINIC | Age: 67
DRG: 923 | End: 2017-05-11
Attending: EMERGENCY MEDICINE
Payer: COMMERCIAL

## 2017-05-11 DIAGNOSIS — T67.01XA HEAT STROKE, INITIAL ENCOUNTER: ICD-10-CM

## 2017-05-11 PROBLEM — R50.9 HYPERTHERMIA: Status: ACTIVE | Noted: 2017-05-11

## 2017-05-11 LAB
ALBUMIN SERPL-MCNC: 4.3 G/DL (ref 3.4–5)
ALBUMIN UR-MCNC: NEGATIVE MG/DL
ALP SERPL-CCNC: 48 U/L (ref 40–150)
ALT SERPL W P-5'-P-CCNC: 24 U/L (ref 0–50)
AMPHETAMINES UR QL SCN: NORMAL
ANION GAP SERPL CALCULATED.3IONS-SCNC: 10 MMOL/L (ref 3–14)
APPEARANCE UR: CLEAR
AST SERPL W P-5'-P-CCNC: 21 U/L (ref 0–45)
BARBITURATES UR QL: NORMAL
BASOPHILS # BLD AUTO: 0 10E9/L (ref 0–0.2)
BASOPHILS NFR BLD AUTO: 0.8 %
BENZODIAZ UR QL: NORMAL
BILIRUB SERPL-MCNC: 0.8 MG/DL (ref 0.2–1.3)
BILIRUB UR QL STRIP: NEGATIVE
BUN SERPL-MCNC: 17 MG/DL (ref 7–30)
CALCIUM SERPL-MCNC: 9.7 MG/DL (ref 8.5–10.1)
CANNABINOIDS UR QL SCN: NORMAL
CHLORIDE SERPL-SCNC: 106 MMOL/L (ref 94–109)
CO2 SERPL-SCNC: 22 MMOL/L (ref 20–32)
COCAINE UR QL: NORMAL
COLOR UR AUTO: YELLOW
CREAT SERPL-MCNC: 1 MG/DL (ref 0.52–1.04)
DIFFERENTIAL METHOD BLD: NORMAL
EOSINOPHIL # BLD AUTO: 0 10E9/L (ref 0–0.7)
EOSINOPHIL NFR BLD AUTO: 0.4 %
ERYTHROCYTE [DISTWIDTH] IN BLOOD BY AUTOMATED COUNT: 12.3 % (ref 10–15)
ETHANOL SERPL-MCNC: <0.01 G/DL
GFR SERPL CREATININE-BSD FRML MDRD: 56 ML/MIN/1.7M2
GLUCOSE BLDC GLUCOMTR-MCNC: 153 MG/DL (ref 70–99)
GLUCOSE SERPL-MCNC: 124 MG/DL (ref 70–99)
GLUCOSE UR STRIP-MCNC: NEGATIVE MG/DL
HCT VFR BLD AUTO: 40.6 % (ref 35–47)
HGB BLD-MCNC: 14.3 G/DL (ref 11.7–15.7)
HGB UR QL STRIP: NEGATIVE
IMM GRANULOCYTES # BLD: 0 10E9/L (ref 0–0.4)
IMM GRANULOCYTES NFR BLD: 0.2 %
INTERPRETATION ECG - MUSE: NORMAL
KETONES UR STRIP-MCNC: 40 MG/DL
LEUKOCYTE ESTERASE UR QL STRIP: NEGATIVE
LYMPHOCYTES # BLD AUTO: 1.4 10E9/L (ref 0.8–5.3)
LYMPHOCYTES NFR BLD AUTO: 28.7 %
MCH RBC QN AUTO: 31.6 PG (ref 26.5–33)
MCHC RBC AUTO-ENTMCNC: 35.2 G/DL (ref 31.5–36.5)
MCV RBC AUTO: 90 FL (ref 78–100)
MONOCYTES # BLD AUTO: 0.4 10E9/L (ref 0–1.3)
MONOCYTES NFR BLD AUTO: 8.1 %
NEUTROPHILS # BLD AUTO: 3.1 10E9/L (ref 1.6–8.3)
NEUTROPHILS NFR BLD AUTO: 61.8 %
NITRATE UR QL: NEGATIVE
NRBC # BLD AUTO: 0 10*3/UL
NRBC BLD AUTO-RTO: 0 /100
OPIATES UR QL SCN: NORMAL
PCP UR QL SCN: NORMAL
PH UR STRIP: 5 PH (ref 5–7)
PLATELET # BLD AUTO: 334 10E9/L (ref 150–450)
POTASSIUM SERPL-SCNC: 4.1 MMOL/L (ref 3.4–5.3)
PROT SERPL-MCNC: 7.5 G/DL (ref 6.8–8.8)
RBC # BLD AUTO: 4.53 10E12/L (ref 3.8–5.2)
SODIUM SERPL-SCNC: 138 MMOL/L (ref 133–144)
SP GR UR STRIP: 1.01 (ref 1–1.03)
TROPONIN I SERPL-MCNC: 0.04 UG/L (ref 0–0.04)
TROPONIN I SERPL-MCNC: 0.05 UG/L (ref 0–0.04)
URN SPEC COLLECT METH UR: ABNORMAL
UROBILINOGEN UR STRIP-MCNC: NORMAL MG/DL (ref 0–2)
WBC # BLD AUTO: 5 10E9/L (ref 4–11)

## 2017-05-11 PROCEDURE — 12000007 ZZH R&B INTERMEDIATE

## 2017-05-11 PROCEDURE — 80053 COMPREHEN METABOLIC PANEL: CPT | Performed by: EMERGENCY MEDICINE

## 2017-05-11 PROCEDURE — 96361 HYDRATE IV INFUSION ADD-ON: CPT

## 2017-05-11 PROCEDURE — 99285 EMERGENCY DEPT VISIT HI MDM: CPT | Mod: 25

## 2017-05-11 PROCEDURE — 40000885 ZZH STATISTIC STEP DOWN HRS EVENING

## 2017-05-11 PROCEDURE — 99223 1ST HOSP IP/OBS HIGH 75: CPT | Performed by: INTERNAL MEDICINE

## 2017-05-11 PROCEDURE — 96360 HYDRATION IV INFUSION INIT: CPT

## 2017-05-11 PROCEDURE — 71010 XR CHEST PORT 1 VW: CPT

## 2017-05-11 PROCEDURE — 84484 ASSAY OF TROPONIN QUANT: CPT | Performed by: INTERNAL MEDICINE

## 2017-05-11 PROCEDURE — 80320 DRUG SCREEN QUANTALCOHOLS: CPT | Performed by: EMERGENCY MEDICINE

## 2017-05-11 PROCEDURE — 81003 URINALYSIS AUTO W/O SCOPE: CPT | Performed by: INTERNAL MEDICINE

## 2017-05-11 PROCEDURE — 80307 DRUG TEST PRSMV CHEM ANLYZR: CPT | Performed by: INTERNAL MEDICINE

## 2017-05-11 PROCEDURE — 85025 COMPLETE CBC W/AUTO DIFF WBC: CPT | Performed by: EMERGENCY MEDICINE

## 2017-05-11 PROCEDURE — 00000146 ZZHCL STATISTIC GLUCOSE BY METER IP

## 2017-05-11 PROCEDURE — 25000128 H RX IP 250 OP 636: Performed by: EMERGENCY MEDICINE

## 2017-05-11 PROCEDURE — 36415 COLL VENOUS BLD VENIPUNCTURE: CPT | Performed by: INTERNAL MEDICINE

## 2017-05-11 PROCEDURE — 25000128 H RX IP 250 OP 636: Performed by: INTERNAL MEDICINE

## 2017-05-11 PROCEDURE — 93005 ELECTROCARDIOGRAM TRACING: CPT

## 2017-05-11 RX ORDER — POTASSIUM CHLORIDE 29.8 MG/ML
20 INJECTION INTRAVENOUS
Status: DISCONTINUED | OUTPATIENT
Start: 2017-05-11 | End: 2017-05-12 | Stop reason: HOSPADM

## 2017-05-11 RX ORDER — POTASSIUM CL/LIDO/0.9 % NACL 10MEQ/0.1L
10 INTRAVENOUS SOLUTION, PIGGYBACK (ML) INTRAVENOUS
Status: DISCONTINUED | OUTPATIENT
Start: 2017-05-11 | End: 2017-05-12 | Stop reason: HOSPADM

## 2017-05-11 RX ORDER — BISACODYL 10 MG
10 SUPPOSITORY, RECTAL RECTAL DAILY PRN
Status: DISCONTINUED | OUTPATIENT
Start: 2017-05-11 | End: 2017-05-12 | Stop reason: HOSPADM

## 2017-05-11 RX ORDER — MAGNESIUM SULFATE HEPTAHYDRATE 40 MG/ML
4 INJECTION, SOLUTION INTRAVENOUS EVERY 4 HOURS PRN
Status: DISCONTINUED | OUTPATIENT
Start: 2017-05-11 | End: 2017-05-12 | Stop reason: HOSPADM

## 2017-05-11 RX ORDER — PROCHLORPERAZINE MALEATE 5 MG
5-10 TABLET ORAL EVERY 6 HOURS PRN
Status: DISCONTINUED | OUTPATIENT
Start: 2017-05-11 | End: 2017-05-12 | Stop reason: HOSPADM

## 2017-05-11 RX ORDER — NALOXONE HYDROCHLORIDE 0.4 MG/ML
.1-.4 INJECTION, SOLUTION INTRAMUSCULAR; INTRAVENOUS; SUBCUTANEOUS
Status: DISCONTINUED | OUTPATIENT
Start: 2017-05-11 | End: 2017-05-12 | Stop reason: HOSPADM

## 2017-05-11 RX ORDER — POTASSIUM CHLORIDE 1.5 G/1.58G
20-40 POWDER, FOR SOLUTION ORAL
Status: DISCONTINUED | OUTPATIENT
Start: 2017-05-11 | End: 2017-05-12 | Stop reason: HOSPADM

## 2017-05-11 RX ORDER — DEXTROSE MONOHYDRATE 25 G/50ML
25-50 INJECTION, SOLUTION INTRAVENOUS
Status: DISCONTINUED | OUTPATIENT
Start: 2017-05-11 | End: 2017-05-12 | Stop reason: HOSPADM

## 2017-05-11 RX ORDER — PROCHLORPERAZINE 25 MG
25 SUPPOSITORY, RECTAL RECTAL EVERY 12 HOURS PRN
Status: DISCONTINUED | OUTPATIENT
Start: 2017-05-11 | End: 2017-05-12 | Stop reason: HOSPADM

## 2017-05-11 RX ORDER — POTASSIUM CHLORIDE 7.45 MG/ML
10 INJECTION INTRAVENOUS
Status: DISCONTINUED | OUTPATIENT
Start: 2017-05-11 | End: 2017-05-12 | Stop reason: HOSPADM

## 2017-05-11 RX ORDER — SODIUM CHLORIDE 9 MG/ML
INJECTION, SOLUTION INTRAVENOUS CONTINUOUS
Status: DISCONTINUED | OUTPATIENT
Start: 2017-05-11 | End: 2017-05-12 | Stop reason: HOSPADM

## 2017-05-11 RX ORDER — ONDANSETRON 2 MG/ML
4 INJECTION INTRAMUSCULAR; INTRAVENOUS EVERY 6 HOURS PRN
Status: DISCONTINUED | OUTPATIENT
Start: 2017-05-11 | End: 2017-05-12 | Stop reason: HOSPADM

## 2017-05-11 RX ORDER — AMOXICILLIN 250 MG
1-2 CAPSULE ORAL 2 TIMES DAILY PRN
Status: DISCONTINUED | OUTPATIENT
Start: 2017-05-11 | End: 2017-05-12 | Stop reason: HOSPADM

## 2017-05-11 RX ORDER — POLYETHYLENE GLYCOL 3350 17 G/17G
17 POWDER, FOR SOLUTION ORAL DAILY PRN
Status: DISCONTINUED | OUTPATIENT
Start: 2017-05-11 | End: 2017-05-12 | Stop reason: HOSPADM

## 2017-05-11 RX ORDER — ACETAMINOPHEN 325 MG/1
650 TABLET ORAL EVERY 4 HOURS PRN
Status: DISCONTINUED | OUTPATIENT
Start: 2017-05-11 | End: 2017-05-12 | Stop reason: HOSPADM

## 2017-05-11 RX ORDER — LIDOCAINE 40 MG/G
CREAM TOPICAL
Status: DISCONTINUED | OUTPATIENT
Start: 2017-05-11 | End: 2017-05-12 | Stop reason: HOSPADM

## 2017-05-11 RX ORDER — POTASSIUM CHLORIDE 1500 MG/1
20-40 TABLET, EXTENDED RELEASE ORAL
Status: DISCONTINUED | OUTPATIENT
Start: 2017-05-11 | End: 2017-05-12 | Stop reason: HOSPADM

## 2017-05-11 RX ORDER — NICOTINE POLACRILEX 4 MG
15-30 LOZENGE BUCCAL
Status: DISCONTINUED | OUTPATIENT
Start: 2017-05-11 | End: 2017-05-12 | Stop reason: HOSPADM

## 2017-05-11 RX ORDER — ONDANSETRON 4 MG/1
4 TABLET, ORALLY DISINTEGRATING ORAL EVERY 6 HOURS PRN
Status: DISCONTINUED | OUTPATIENT
Start: 2017-05-11 | End: 2017-05-12 | Stop reason: HOSPADM

## 2017-05-11 RX ADMIN — SODIUM CHLORIDE 1000 ML: 9 INJECTION, SOLUTION INTRAVENOUS at 14:33

## 2017-05-11 RX ADMIN — SODIUM CHLORIDE: 9 INJECTION, SOLUTION INTRAVENOUS at 17:41

## 2017-05-11 RX ADMIN — SODIUM CHLORIDE 1000 ML: 9 INJECTION, SOLUTION INTRAVENOUS at 15:37

## 2017-05-11 ASSESSMENT — ACTIVITIES OF DAILY LIVING (ADL)
NUMBER_OF_TIMES_PATIENT_HAS_FALLEN_WITHIN_LAST_SIX_MONTHS: 1
COGNITION: 1 - ATTENTION OR MEMORY DEFICITS
TRANSFERRING: 0-->INDEPENDENT
RETIRED_COMMUNICATION: 0-->UNDERSTANDS/COMMUNICATES WITHOUT DIFFICULTY
DRESS: 0-->INDEPENDENT
TOILETING: 0-->INDEPENDENT
FALL_HISTORY_WITHIN_LAST_SIX_MONTHS: YES
AMBULATION: 0-->INDEPENDENT
BATHING: 0-->INDEPENDENT
RETIRED_EATING: 0-->INDEPENDENT
SWALLOWING: 0-->SWALLOWS FOODS/LIQUIDS WITHOUT DIFFICULTY

## 2017-05-11 NOTE — IP AVS SNAPSHOT
MRN:[id number]                      After Visit Summary   5/11/2017    Elise Bernard    MRN: [id number]           Thank you!     Thank you for choosing [facility] for your care. Our goal is always to provide you with excellent care. Hearing back from our patients is one way we can continue to improve our services. Please take a few minutes to complete the written survey that you may receive in the mail after you visit with us. Thank you!        Patient Information     Date Of Birth          1950        Designated Caregiver       Most Recent Value    Caregiver    Will someone help with your care after discharge? yes    Name of designated caregiver Johnie    Phone number of caregiver [phone]    Caregiver address see contact info      About your hospital stay     You were admitted on:  May 11, 2017 You last received care in the:  [place] [address] [facility]    You were discharged on:  May 12, 2017        Reason for your hospital stay       Evaluation of hyperthermia and other medical problems.                  Who to Call     For medical emergencies, please call 911.  For non-urgent questions about your medical care, please call your primary care provider or clinic, [phone]          Attending Provider     Provider Specialty    Tabatha Gayle MD Emergency Medicine    [facility], Raj Kumar MD Internal Medicine       Primary Care Provider Office Phone # Fax #    Myles Birmingham [phone]       [facility] [address]  [facility] [address]         When to contact your care team       Call your primary doctor if you have any of the following: temperature greater than 100.F or if subjective fever returns.                  After Care Instructions     Activity       Your activity upon discharge: activity as tolerated            Diet       Follow this diet upon discharge: Orders Placed This Encounter      Regular Diet Adult; should also  be CHO/ADA-consistent                  Follow-up Appointments     Follow-up and recommended labs and tests        1. Follow up with primary care provider, Myles Birmingham, within 7 days for hospital follow- up.  No follow up labs or test are needed.    2. Follow up with Neurologist Dr. Wilson as scheduled  3. Follow up with [facility] (per Elise's  Johnie, scheduling for this is in progress...)                  Pending Results     No orders found for last 3 day(s).            Statement of Approval     Ordered          05/12/17 1609  I have reviewed and agree with all the recommendations and orders detailed in this document.  EFFECTIVE NOW     Approved and electronically signed by:  Myles Law MD             Admission Information     Date & Time Provider Department Dept. Phone    5/11/2017 Raj Thomas MD [place] [address] [facility] [phone]      Your Vitals Were     Blood Pressure Pulse Temperature Respirations Weight Pulse Oximetry    160/88 96 98.3  F (36.8  C) 16 57.4 kg (126 lb 8.7 oz) 93%    BMI (Body Mass Index)                   21.72 kg/m2           [facility] Information     [facility] gives you secure access to your electronic health record. If you see a primary care provider, you can also send messages to your care team and make appointments. If you have questions, please call your primary care clinic.  If you do not have a primary care provider, please call [phone] and they will assist you.        Care EveryWhere ID     This is your Care EveryWhere ID. This could be used by other organizations to access your [place] medical records  [id number]           Review of your medicines      CONTINUE these medicines which have NOT CHANGED        Dose / Directions    ACCU-CHEK JUANY PLUS test strip   Generic drug:  blood glucose monitoring        Refills:  0       escitalopram 10 MG tablet   Commonly known as:  LEXAPRO        Dose:  10 mg   Take 10 mg  by mouth daily   Refills:  0       estradiol 0.0375 MG/24HR BIW patch   Commonly known as:  VIVELLE-DOT        Dose:  1 patch   Place 1 patch onto the skin twice a week   Refills:  0       GLIPIZIDE PO        Dose:  2.5 mg   Take 2.5 mg by mouth At Bedtime   Refills:  0       * LAMOTRIGINE PO        Dose:  25 mg   Take 25 mg by mouth daily X 14 days   Refills:  0       * lamoTRIgine 200 MG tablet   Commonly known as:  LaMICtal   Used for:  Fronto-temporal dementia        Dose:  200 mg   Take 1 tablet (200 mg) by mouth daily   Quantity:  30 tablet   Refills:  3       LIVALO PO        Dose:  4 mg   Take 4 mg by mouth daily   Refills:  0       LOSARTAN POTASSIUM PO        Dose:  50 mg   Take 50 mg by mouth daily   Refills:  0       METOPROLOL SUCCINATE ER PO        Dose:  50 mg   Take 50 mg by mouth daily   Refills:  0       PROGESTERONE MICRONIZED PO        Dose:  100 mg   Take 100 mg by mouth daily   Refills:  0       traZODone 50 MG tablet   Commonly known as:  DESYREL        Dose:  25 mg   Take 25 mg by mouth At Bedtime   Refills:  0       * Notice:  This list has 2 medication(s) that are the same as other medications prescribed for you. Read the directions carefully, and ask your doctor or other care provider to review them with you.      STOP taking     DONEPEZIL HCL PO           GEMFIBROZIL PO           IBUPROFEN PO                    Protect others around you: Learn how to safely use, store and throw away your medicines at www.disposemymeds.org.             Medication List: This is a list of all your medications and when to take them. Check marks below indicate your daily home schedule. Keep this list as a reference.      Medications           Morning Afternoon Evening Bedtime As Needed    ACCU-CHEK JUANY PLUS test strip   Generic drug:  blood glucose monitoring                                escitalopram 10 MG tablet   Commonly known as:  LEXAPRO   Take 10 mg by mouth daily                                 estradiol 0.0375 MG/24HR BIW patch   Commonly known as:  VIVELLE-DOT   Place 1 patch onto the skin twice a week                                GLIPIZIDE PO   Take 2.5 mg by mouth At Bedtime                                * LAMOTRIGINE PO   Take 25 mg by mouth daily X 14 days                                * lamoTRIgine 200 MG tablet   Commonly known as:  LaMICtal   Take 1 tablet (200 mg) by mouth daily                                LIVALO PO   Take 4 mg by mouth daily                                LOSARTAN POTASSIUM PO   Take 50 mg by mouth daily                                METOPROLOL SUCCINATE ER PO   Take 50 mg by mouth daily                                PROGESTERONE MICRONIZED PO   Take 100 mg by mouth daily                                traZODone 50 MG tablet   Commonly known as:  DESYREL   Take 25 mg by mouth At Bedtime                                * Notice:  This list has 2 medication(s) that are the same as other medications prescribed for you. Read the directions carefully, and ask your doctor or other care provider to review them with you.
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
## 2017-05-11 NOTE — H&P
HISTORY OF PRESENT ILLNESS:  Ms. Elise Bernard is a  66-year-old female admitted after being found passed out in her hot tub this afternoon.        The patient's  states that he was working in his yard.  His wife was in the hot tub at the time; he checked on her at 1:00 p.m. and he is not sure how long she had been in the hot tub at that time, but she was fine.  When he checked on her at 1:40 she was face down in the hot tub with her mouth below water, bubbling but her nose remained above the water.  He was able to pull her head up.  He called 911.  They arrived and were able to extricate her from the hot tub.  On glucose check the patient was euglycemic -  she was transferred to [facility].  En route she started to wake up, initial rectal temperature in the emergency room was 104.9.  The patient was given ice packs, cold normal saline and was wiped down with a cool rag and placed under a fan.  Her rectal temperature subsequently improved to 99.2.  At that point, the patient started shivering and was given a blanket. Recheck of temperature is pending at this time.  The  denies any recent medication changes, says that her health recently has been good.  She has not had any recent illnesses. Prior to today no fevers, chills, nausea, vomiting, diarrhea, nasal congestion, runny nose, sore throat, cough, shortness of breath, dysuria or red hot tender open areas on her skin.      PAST MEDICAL HISTORY:  Significant for frontotemporal dementia, hypertension, hyperlipidemia, tonsillectomy and diabetes mellitus type 2,  sciatica, breast reduction, tonsillectomy, adenoidectomy, tympanoplasty.      MEDICATIONS PRIOR TO ADMISSION:  This list is unconfirmed .   1.  Lamictal 200 mg daily.   2.  Ambien 10 mg daily.   3.  Vitamin C 500 mg daily.   4.  Losartan 50 mg daily.   5.  Glipizide 10 mg daily.   6.  Januvia 100 mg daily.   7.  Percocet every 4 hours as needed.   8.  Trazodone 50 mg at  bedtime.   9.  Vivelle-dot on 0.075 mg bi-weekly.   10.  Lexapro 10 mg daily.   11.  Aspirin 81 mg daily.   12.  Cholecalciferol 50,000 units weekly.   13.  Metoprolol 50 mg daily.   14.  Pitavastatin 4 mg daily.   15.  Gemfibrozil 600 mg daily.   16.  Allertac 10 mg. daily.      ALLERGIES:  Vicodin.      SOCIAL HISTORY:  Patient is , lives with her .  She attends adult  4-1/2 days a week.  She is a nonsmoker.  Occasional alcohol use, no drug use.      FAMILY HISTORY:  The family has a brother with a cerebral aneurysm.  Father with history of heart disease and AAA repair.  Mother with congestive heart failure, diabetes, hypertension and hyperlipidemia.      REVIEW OF SYSTEMS:  Unobtainable in this demented patient.      PHYSICAL EXAMINATION:   VITAL SIGNS:  Temperature 98.9, pulse 102, respiratory rate 16, blood pressure 132/81, satting 100% on room air.   GENERAL:  This is a thin female, alert and oriented to person only.   HEENT:  Head appears atraumatic, normocephalic.  Sclerae noninjected, anicteric.  Oral mucosa moist without lesions or exudates.   NECK:  Supple without any lymphadenopathy.   LUNGS:  Clear to auscultation bilaterally.   HEART:  Regular rate and rhythm without murmurs, gallops, rubs.   ABDOMEN:  Bowel sounds positive.  Soft, nontender, nondistended, no hepatosplenomegaly.   MUSCULOSKELETAL:  Normal muscle mass and tone.   SKIN:  Shows no rashes.   LYMPHATIC:  No peripheral edema, no lymphadenopathy.   NEUROLOGIC:  Cranial nerves II-XII are intact.  Strength in all 4 extremities normal, coordination normal.  Reflexes normal and symmetrical.      ASSESSMENT AND PLAN:  This is a 66-year-old female admitted with hyperthermia.   1.  Hyperthermia.  The patient [place] [facility] [place] as patient is a high risk for recurrence of hyperthermia.   2.  Syncope.  Unclear why patient lost consciousness will check alcohol level, also check a urine tox  screen.  Patient will be on continuous cardiac monitoring.  We will order an echocardiogram.  We will check serial troponins.  We will fluid resuscitate.  The  does not believe the patient has access to her medications and so accidental overdose is unlikely.  However, this patient will be monitored closely overnight.   3.  Frontotemporal dementia.  Patient will be on fall risk and will write for delirium precautions.   4.  Disposition.  Pharmacy has informed me that the patient is not filling her medications.  The patient's  believes that the patient is receiving medications at her adult ; will ask social work to evaluate and see if there are any additional community resources available as the  does appear to be overwhelmed with his wife's care.      CODE STATUS.  Patient is full code.         KOLTON NAVAS MD             D: 2017 16:28   T: 2017 17:06   MT: [id number]      Name:     DAYNA TREJO   MRN:      [id number]        Account:      [id number]   :      1950           Admitted:     [id number]      Document: [id number]

## 2017-05-11 NOTE — IP AVS SNAPSHOT
[facility]    [address] [place] [address]    Phone:  [phone]                                       After Visit Summary   5/11/2017    Elise Bernard    MRN: [id number]           After Visit Summary Signature Page     I have received my discharge instructions, and my questions have been answered. I have discussed any challenges I see with this plan with the nurse or doctor.    ..........................................................................................................................................  Patient/Patient Representative Signature      ..........................................................................................................................................  Patient Representative Print Name and Relationship to Patient    ..................................................               ................................................  Date                                            Time    ..........................................................................................................................................  Reviewed by Signature/Title    ...................................................              ..............................................  Date                                                            Time

## 2017-05-11 NOTE — ED NOTES
Upon arrival cold packs to pt's medardo groins, under medardo arms - sponging pt down with cold wash clothes, cold Normal Saline infusing thru IV

## 2017-05-11 NOTE — ED NOTES
went home to retreive personal items. Patient placement changed room assignment,  was called and notified of new assignment to [phone]

## 2017-05-11 NOTE — ED NOTES
"[facility]  ED Nurse Handoff Report    ED Chief complaint: Heat Exposure (pt found in hot tub unresponsive - mouth under water nose above water per  -  removed pt from hot tub called 911 - paramedics found pt awake slow to respond )      ED Diagnosis:   Final diagnoses:   Heat exposure       Code Status: Full Code    Allergies:   Allergies   Allergen Reactions     Vicodin [Hydrocodone-Acetaminophen] Itching       Activity level - Baseline/Home:  Independent    Activity Level - Current:   Independent     Needed?: No    Isolation: No  Infection: Not Applicable    Bariatric?: No    Vital Signs:   Vitals:    05/11/17 1500 05/11/17 1515 05/11/17 1529 05/11/17 1530   BP: 134/67 127/76 120/71    Resp: 20 20  15   Temp: 99.2  F (37.3  C)      TempSrc: Rectal      SpO2: 100% 100% 98% 100%       Cardiac Rhythm: ,   Cardiac  Cardiac Rhythm: Sinus tachycardia    Pain level:      Is this patient confused?: Yes, some dementia at baseline    Patient Report: Initial Complaint: Elise Bernard is a 66 year old female with a history of early dimentia, hypertension and diabetes who presents to the ED via ambulance after being found unresponsive in a hot tub by her . The patient's  reports that at 1pm he was doing yard work and his wife went into the hot tub. He checked on her at 1:40pm and reports she was sitting in the hot tub, unresponsive, with her mouth below the water and her nose above the water, making \"bubbling\" noises. He pulled her head above water and called 911 right away. EMS notes that her skin was very red and she was tachycardic. She starting waking up in the ambulance  Focused Assessment: Currently alert, speaks in repetitive sentences, lungs clear  Tests Performed: labs,ekg,xray  Abnormal Results: none  Treatments provided: fluids, 2L NS    Family Comments:  present    OBS brochure/video discussed/provided to patient: Yes    ED Medications:   Medications "   0.9% sodium chloride BOLUS (1,000 mLs Intravenous New Bag 5/11/17 1537)   0.9% sodium chloride BOLUS (0 mLs Intravenous Stopped 5/11/17 1515)       Drips infusing?:  Yes      ED NURSE PHONE NUMBER: *[id number]

## 2017-05-12 ENCOUNTER — APPOINTMENT (OUTPATIENT)
Dept: CARDIOLOGY | Facility: CLINIC | Age: 67
DRG: 923 | End: 2017-05-12
Attending: INTERNAL MEDICINE
Payer: COMMERCIAL

## 2017-05-12 VITALS
DIASTOLIC BLOOD PRESSURE: 88 MMHG | HEART RATE: 96 BPM | RESPIRATION RATE: 16 BRPM | TEMPERATURE: 98.3 F | SYSTOLIC BLOOD PRESSURE: 160 MMHG | OXYGEN SATURATION: 93 % | BODY MASS INDEX: 21.72 KG/M2 | WEIGHT: 126.54 LBS

## 2017-05-12 LAB
ANION GAP SERPL CALCULATED.3IONS-SCNC: 10 MMOL/L (ref 3–14)
BUN SERPL-MCNC: 14 MG/DL (ref 7–30)
CALCIUM SERPL-MCNC: 8.1 MG/DL (ref 8.5–10.1)
CHLORIDE SERPL-SCNC: 109 MMOL/L (ref 94–109)
CO2 SERPL-SCNC: 21 MMOL/L (ref 20–32)
CREAT SERPL-MCNC: 0.76 MG/DL (ref 0.52–1.04)
ERYTHROCYTE [DISTWIDTH] IN BLOOD BY AUTOMATED COUNT: 12.5 % (ref 10–15)
GFR SERPL CREATININE-BSD FRML MDRD: 76 ML/MIN/1.7M2
GLUCOSE BLDC GLUCOMTR-MCNC: 164 MG/DL (ref 70–99)
GLUCOSE BLDC GLUCOMTR-MCNC: 168 MG/DL (ref 70–99)
GLUCOSE SERPL-MCNC: 105 MG/DL (ref 70–99)
HBA1C MFR BLD: 6.8 % (ref 4.3–6)
HCT VFR BLD AUTO: 35.3 % (ref 35–47)
HGB BLD-MCNC: 12.2 G/DL (ref 11.7–15.7)
MCH RBC QN AUTO: 31.4 PG (ref 26.5–33)
MCHC RBC AUTO-ENTMCNC: 34.6 G/DL (ref 31.5–36.5)
MCV RBC AUTO: 91 FL (ref 78–100)
PLATELET # BLD AUTO: 210 10E9/L (ref 150–450)
POTASSIUM SERPL-SCNC: 3.6 MMOL/L (ref 3.4–5.3)
RBC # BLD AUTO: 3.88 10E12/L (ref 3.8–5.2)
SODIUM SERPL-SCNC: 140 MMOL/L (ref 133–144)
TROPONIN I SERPL-MCNC: 0.05 UG/L (ref 0–0.04)
WBC # BLD AUTO: 6 10E9/L (ref 4–11)

## 2017-05-12 PROCEDURE — 25500064 ZZH RX 255 OP 636: Performed by: INTERNAL MEDICINE

## 2017-05-12 PROCEDURE — 40000264 ECHO COMPLETE WITH LUMASON

## 2017-05-12 PROCEDURE — 00000146 ZZHCL STATISTIC GLUCOSE BY METER IP

## 2017-05-12 PROCEDURE — 84484 ASSAY OF TROPONIN QUANT: CPT | Performed by: INTERNAL MEDICINE

## 2017-05-12 PROCEDURE — 93306 TTE W/DOPPLER COMPLETE: CPT | Mod: 26 | Performed by: INTERNAL MEDICINE

## 2017-05-12 PROCEDURE — 85027 COMPLETE CBC AUTOMATED: CPT | Performed by: INTERNAL MEDICINE

## 2017-05-12 PROCEDURE — 40000886 ZZH STATISTIC STEP DOWN HRS DAY

## 2017-05-12 PROCEDURE — 36415 COLL VENOUS BLD VENIPUNCTURE: CPT | Performed by: INTERNAL MEDICINE

## 2017-05-12 PROCEDURE — 80048 BASIC METABOLIC PNL TOTAL CA: CPT | Performed by: INTERNAL MEDICINE

## 2017-05-12 PROCEDURE — 25000128 H RX IP 250 OP 636: Performed by: INTERNAL MEDICINE

## 2017-05-12 PROCEDURE — 40000884 ZZH STATISTIC STEP DOWN HRS NIGHT

## 2017-05-12 PROCEDURE — 99239 HOSP IP/OBS DSCHRG MGMT >30: CPT | Performed by: INTERNAL MEDICINE

## 2017-05-12 PROCEDURE — 83036 HEMOGLOBIN GLYCOSYLATED A1C: CPT | Performed by: INTERNAL MEDICINE

## 2017-05-12 RX ADMIN — SODIUM CHLORIDE: 9 INJECTION, SOLUTION INTRAVENOUS at 05:39

## 2017-05-12 RX ADMIN — SULFUR HEXAFLUORIDE 5 ML: KIT at 09:15

## 2017-05-12 RX ADMIN — SODIUM CHLORIDE: 9 INJECTION, SOLUTION INTRAVENOUS at 12:45

## 2017-05-12 NOTE — PROGRESS NOTES
SW: Consult acknowledged. Discussed with hospitalist and pt  refuses to speak with social workers due to past experiences. Rn care coordinator attempted to meet however also refused to engage in conversation for further assist resources at home. RENE placed a vulnerable adult report, report ID: [id number].

## 2017-05-12 NOTE — PROGRESS NOTES
"After discussion with MD regarding discharge plan, went to offer additional resources for at home. I asked spouse, Johnie, if there is anything else we could do to assist him for discharge. He stated \"no\" he did not need any additional resources for at home and\" I want people to leave me alone\" then, \" thank you\" and anticipates discharging today to home.  "

## 2017-05-12 NOTE — PROGRESS NOTES
I met with spouse, Johnie, to discuss questions on insurance. He needed clarification on inpatient and observation and the utilization review process.

## 2017-05-12 NOTE — PLAN OF CARE
Problem: Goal Outcome Summary  Goal: Goal Outcome Summary  Outcome: Improving  VSS and WNL for this pt. Pt was afebrile, transfers with assist of 1 and LS were clear. Tele was SR, had several incontinent voids and is A&O to self only. Pt denies pain, is very impulsive and exited bed several times without staff. Safety checks complete, will pass on and continue to monitor.

## 2017-05-12 NOTE — PLAN OF CARE
Problem: Goal Outcome Summary  Goal: Goal Outcome Summary  Outcome: No Change  Pt confused, oriented to self only.  Impulsive and periodically picking at IV site.  Sitter present.  Denies pain.  VSS. Up with SBA. Adequate I&Os.

## 2017-05-14 NOTE — DISCHARGE SUMMARY
"[facility]    Discharge Summary  Hospitalist    Date of Admission:  5/11/2017  Date of Discharge:  5/12/2017  Discharging Provider:  ZI Law MD, FACP     Date of Service (when I saw the patient) 5/12/2017    Discharge Diagnoses   1. Heat stroke, initial encounter  2. Unresponsive episode  3. Hyperthermia  4. Frontotemporal dementia  5. Diabetes mellitus, Type II    History of Present Illness   Please see admit H & P by Dr. Thomas for further details. Elise Bernard is a 66-year-old female admitted after being found passed out in her hot tub on the afternoon of admission.  Elise's  Johnie stated that he was working in his yard while Elise was in the house; her Adult  staff was unable to be at their home on the day of admission.  Johnie found Elise in their hot tub when he checked on her at 1:00 p.m. and he is not sure how long she had been there, but said she was fine. When he checked on her again at 1:40 p.m., she was face down in the hot tub with her mouth below water,   \"bubbling\" but her nose remained above the water. He was able to pull her head up. He called 911. EMS arrived and were able to extricate her from the hot tub. On glucose check the patient was euglycemic; she was transferred to [facility].  En route, she started to wake up; initial rectal temperature in the emergency room was 104.9F.  Elise was treated with ice packs, cold normal saline and was wiped down with a cool towels and placed under a fan.  Her rectal temperature subsequently improved to 99.2F; she then started shivering and cooling methods were stopped.  Her  was not aware of any recent medication changes, and reported that her health recently has been good. She has not had any recent illnesses. Prior to the day of admission, she has had no recent fevers, chills, nausea, vomiting, diarrhea, nasal congestion, runny nose, sore throat, cough, shortness of breath, " dysuria or red hot tender open areas on her skin.    Hospital Course   Elise was admitted on 5/11/2017.  The following problems were addressed during her hospitalization:    1. Heat stroke due to hyperthermia from prolonged time in hot tub; following measures taken in the ED (please see note by Dr. Gayle), Elise's temperature returned to the normal range, and she had no further fevers during this admission.  She was monitored initially in the [facility], and then transferred to the Med-Surg. Unit for ongoing care.  No new neurologic symptoms or exam findings were noted during this admission.    2. Hyperthermia; her temperature returned to the normal range shortly after her treatments in the ED, as noted above.     3. Unresponsive episode; unclear cause.  No recurrence during this admission.  Testing done here (blood alcohol level, urine tox screen, continuous cardiac monitoring, echocardiogram) did not reveal any acute abnormalities.  Serial troponins were mildly elevated, likely due to her initial hyperthermia and possibly tachycardia; she did not indicate having any physical distress or chest symptoms and her vital signs remained stable throughout this admission.  Her  Johnie indicated that Elise does not have access to her medications and so accidental overdose was considered unlikely.    4. Frontotemporal dementia; significant cognitive and functional impairment and with very limited judgement, based on recent EMR notes and events leading up to her admission yesterday. She currently has an Adult  attendant visit at their home part or most of daytime working hours Monday through Friday; when Adult  staff cannot come to the house (as was the case yesterday, 5/11/17), Johnie says he does his best to take care of her while he is at home. He is also her full-time caregiver weekday nights and on weekends.  Elise has been followed recently by Dr. Wilson of Neurology, and Johnie indicates he has  also been trying to have further evaluation done in the Memory Disorders center at [facility] in [place]; the status of this evaluation is unclear at the time of discharge.  Per Johnie, she has not seen her PCP Dr. Birmingham since February of this year.  Of note, our Social Work team was consulted and attempted to review the patient's care with her ; I discussed Elise's care with our  multiple times today, but Johnie has refused their attempts to meet with him; he has also indicated he is not interested in receiving any other assistance with Elise's care at home at this time.    5. Diabetes mellitus; sugars were mildly elevated during this admission.  Based on recent EMR notes and conversations with Johnie today, it appears Elise has not been able to take her medications regularly for a number of reasons; it is also unclear if she able to maintain an ADA-appropriate diet.    6. Medication compliance concerns: based on recent EMR notes and information obtained by Pharmacy, it appears Elise has not been receiving her medications regularly for a number of reasons (see admit H & P and notes by Pharmacy for further details); it is also not clear whether her Adult  staff have been able to administer her medications consistently. has informed me that the patient is not filling her medications.    ZI Law MD, [facility]   Internal Medicine Hospitalist      Significant Results and Procedures   See below, and ED note/results for further details.    Pending Results   None  Unresulted Labs Ordered in the Past 30 Days of this Admission     No orders found from 3/12/2017 to 5/12/2017.          Code Status   Full Code, per review on day of admission.       Primary Care Physician   Myles Birmingham    Physical Exam                      Vitals:    05/12/17 0600   Weight: 57.4 kg (126 lb 8.7 oz)     Vital Signs with Ranges     I/O last 3 completed shifts:  In: 1760 [P.O.:760; IV  Piggyback:1000]  Out: 200 [Urine:200]    Constitutional:  No acute distress; lying in bed and then sitting up, very active, picking at her IV line and tape and requiring frequent redirection by her  and care attendant.  HEENT: AT/NC; sclerae clear; oropharynx moist.  No obvious scalp abnormalities.  EOM grossly intact.  Neck: good ROM and without apparent discomfort while sitting up in bed.  Respiratory: Good air entry bilaterally, no wheezing or rhonchi  Cardiovascular: S1, S2 present, regular rate and rhythm, without murmur, rubs or gallops  GI: abdomen - flat; positive bowel sounds, soft, non-tender, non-distended  Skin/Integumen: no edema, no cyanosis, no rash  Neurologic: awake, hyperkinetic; no focal deficits.  Has difficulty following directions; is not able to give specific/relevant answers to questions, but instead sings answers when she is questioned.    Discharge Disposition   Discharged to home  Condition at discharge: Stable, improved    Consultations This Hospital Stay   SOCIAL WORK IP CONSULT    Time Spent on this Encounter   I, Myles Law, personally saw the patient today and spent greater than 30 minutes discharging this patient.    Discharge Orders     Reason for your hospital stay   Evaluation of hyperthermia and other medical problems.     Follow-up and recommended labs and tests    1. Follow up with primary care provider, Myles Birmingham, within 7 days for hospital follow- up.  No follow up labs or test are needed.    2. Follow up with Neurologist Dr. Wilson as scheduled  3. Follow up with [facility] (per Elise's  Johnie, scheduling for this is in progress...)     Activity   Your activity upon discharge: activity as tolerated     When to contact your care team   Call your primary doctor if you have any of the following: temperature greater than 100.F or if subjective fever returns.     Full Code     Diet   Follow this diet upon discharge: Orders Placed This  Encounter     Regular Diet Adult; should also be CHO/ADA-consistent     * Blood sugars should be monitored at home per previous discussions with her PCP; this will need to be   Readdressed at follow up appts. With Dr. Birmingham in the next several days.    Discharge Medications   Discharge Medication List as of 5/12/2017  4:15 PM      CONTINUE these medications which have NOT CHANGED    Details   !! LAMOTRIGINE PO Take 25 mg by mouth daily X 14 days, Historical      !! lamoTRIgine (LAMICTAL) 200 MG tablet Take 1 tablet (200 mg) by mouth daily, Disp-30 tablet, R-3, E-Prescribe      LOSARTAN POTASSIUM PO Take 50 mg by mouth daily , Historical      GLIPIZIDE PO Take 2.5 mg by mouth At Bedtime , Historical      traZODone (DESYREL) 50 MG tablet Take 25 mg by mouth At Bedtime , Historical      estradiol (VIVELLE-DOT) 0.0375 MG/24HR BIW patch Place 1 patch onto the skin twice a week , Historical      ACCU-CHEK JUANY PLUS test strip THANIA, Historical      escitalopram (LEXAPRO) 10 MG tablet Take 10 mg by mouth daily , Historical      METOPROLOL SUCCINATE ER PO Take 50 mg by mouth daily, Historical      Pitavastatin Calcium (LIVALO PO) Take 4 mg by mouth daily, Historical      PROGESTERONE MICRONIZED PO Take 100 mg by mouth daily , Historical       !! - Potential duplicate medications found. Please discuss with provider.      STOP taking these medications       DONEPEZIL HCL PO Comments:   Reason for Stopping:         IBUPROFEN PO Comments:   Reason for Stopping:         GEMFIBROZIL PO Comments:   Reason for Stopping:             **Please see Pharmacy notes from this admission; no new medications were prescribed during this admission.  PTA medications were not changed; this will need further review with her PCP shortly after discharge.**    Allergies   Allergies   Allergen Reactions     Vicodin [Hydrocodone-Acetaminophen] Itching     Data   Most Recent 3 CBC's:  Recent Labs   Lab Test  05/12/17   0535  05/11/17   1430  03/26/17    0800   WBC  6.0  5.0  6.0   HGB  12.2  14.3  13.9   MCV  91  90  92   PLT  210  334  235      Most Recent 3 BMP's:  Recent Labs   Lab Test  05/12/17   0535  05/11/17   1430  03/26/17   0800   NA  140  138  137   POTASSIUM  3.6  4.1  4.3   CHLORIDE  109  106  104   CO2  21  22  26   BUN  14  17  13   CR  0.76  1.00  0.70   ANIONGAP  10  10  7   CYRUS  8.1*  9.7  8.5   GLC  105*  124*  162*     Most Recent 2 LFT's:  Recent Labs   Lab Test  05/11/17   1430   AST  21   ALT  24   ALKPHOS  48   BILITOTAL  0.8     Most Recent INR's and Anticoagulation Dosing History:  Anticoagulation Dose History     There is no flowsheet data to display.        Most Recent 3 Troponin's:  Recent Labs   Lab Test  05/12/17   0210  05/11/17   2145  05/11/17   1835   TROPI  0.050*  0.053*  0.043     Most Recent Cholesterol Panel:No lab results found.  Most Recent 6 Bacteria Isolates From Any Culture (See EPIC Reports for Culture Details):No lab results found.  Most Recent TSH, T4 and A1c Labs:  Recent Labs   Lab Test  05/12/17   0535   A1C  6.8*     Results for orders placed or performed during the hospital encounter of 05/11/17   Chest  XR, 1 view PORTABLE    Narrative    XR CHEST PORT 1 VW 5/11/2017 2:45 PM    COMPARISON: 2/18/2004    HISTORY: Unresponsive in hot tub, hyperthermia, check for aspiration.      Impression    IMPRESSION: Cardiac silhouette and pulmonary vasculature are within  normal limits. No focal airspace disease, pleural effusion or  pneumothorax.    MELO BELLO

## 2017-05-19 ENCOUNTER — CARE COORDINATION (OUTPATIENT)
Dept: CASE MANAGEMENT | Facility: CLINIC | Age: 67
End: 2017-05-19

## 2017-06-03 ENCOUNTER — HEALTH MAINTENANCE LETTER (OUTPATIENT)
Age: 67
End: 2017-06-03

## (undated) RX ORDER — LIDOCAINE HYDROCHLORIDE 10 MG/ML
INJECTION, SOLUTION EPIDURAL; INFILTRATION; INTRACAUDAL; PERINEURAL
Status: DISPENSED
Start: 2017-01-27